# Patient Record
Sex: MALE | Race: WHITE | Employment: FULL TIME | URBAN - METROPOLITAN AREA
[De-identification: names, ages, dates, MRNs, and addresses within clinical notes are randomized per-mention and may not be internally consistent; named-entity substitution may affect disease eponyms.]

---

## 2018-12-11 ENCOUNTER — APPOINTMENT (OUTPATIENT)
Dept: CT IMAGING | Age: 38
End: 2018-12-11
Payer: COMMERCIAL

## 2018-12-11 ENCOUNTER — HOSPITAL ENCOUNTER (EMERGENCY)
Age: 38
Discharge: HOME | End: 2018-12-11
Attending: EMERGENCY MEDICINE
Payer: COMMERCIAL

## 2018-12-11 VITALS
OXYGEN SATURATION: 96 % | BODY MASS INDEX: 29.71 KG/M2 | HEART RATE: 77 BPM | RESPIRATION RATE: 18 BRPM | DIASTOLIC BLOOD PRESSURE: 74 MMHG | TEMPERATURE: 98.1 F | SYSTOLIC BLOOD PRESSURE: 115 MMHG | WEIGHT: 213 LBS

## 2018-12-11 DIAGNOSIS — R10.13 ABDOMINAL PAIN, EPIGASTRIC: Primary | ICD-10-CM

## 2018-12-11 LAB
A/G RATIO: 1.6 (ref 1.1–2.2)
ALBUMIN SERPL-MCNC: 4.5 G/DL (ref 3.4–5)
ALP BLD-CCNC: 70 U/L (ref 40–129)
ALT SERPL-CCNC: 18 U/L (ref 10–40)
ANION GAP SERPL CALCULATED.3IONS-SCNC: 11 MMOL/L (ref 3–16)
AST SERPL-CCNC: 16 U/L (ref 15–37)
BASOPHILS ABSOLUTE: 0.1 K/UL (ref 0–0.2)
BASOPHILS RELATIVE PERCENT: 0.5 %
BILIRUB SERPL-MCNC: 0.5 MG/DL (ref 0–1)
BILIRUBIN URINE: NEGATIVE
BLOOD, URINE: NEGATIVE
BUN BLDV-MCNC: 15 MG/DL (ref 7–20)
CALCIUM SERPL-MCNC: 9.3 MG/DL (ref 8.3–10.6)
CHLORIDE BLD-SCNC: 102 MMOL/L (ref 99–110)
CLARITY: CLEAR
CO2: 26 MMOL/L (ref 21–32)
COLOR: YELLOW
CREAT SERPL-MCNC: 1 MG/DL (ref 0.9–1.3)
EOSINOPHILS ABSOLUTE: 0.1 K/UL (ref 0–0.6)
EOSINOPHILS RELATIVE PERCENT: 0.8 %
GFR AFRICAN AMERICAN: >60
GFR NON-AFRICAN AMERICAN: >60
GLOBULIN: 2.9 G/DL
GLUCOSE BLD-MCNC: 107 MG/DL (ref 70–99)
GLUCOSE URINE: NEGATIVE MG/DL
HCT VFR BLD CALC: 47.3 % (ref 40.5–52.5)
HEMOGLOBIN: 16.3 G/DL (ref 13.5–17.5)
KETONES, URINE: NEGATIVE MG/DL
LEUKOCYTE ESTERASE, URINE: NEGATIVE
LIPASE: 14 U/L (ref 13–60)
LYMPHOCYTES ABSOLUTE: 1.7 K/UL (ref 1–5.1)
LYMPHOCYTES RELATIVE PERCENT: 16.7 %
MCH RBC QN AUTO: 31.6 PG (ref 26–34)
MCHC RBC AUTO-ENTMCNC: 34.4 G/DL (ref 31–36)
MCV RBC AUTO: 91.7 FL (ref 80–100)
MICROSCOPIC EXAMINATION: NORMAL
MONOCYTES ABSOLUTE: 0.6 K/UL (ref 0–1.3)
MONOCYTES RELATIVE PERCENT: 5.6 %
NEUTROPHILS ABSOLUTE: 7.9 K/UL (ref 1.7–7.7)
NEUTROPHILS RELATIVE PERCENT: 76.4 %
NITRITE, URINE: NEGATIVE
PDW BLD-RTO: 13.7 % (ref 12.4–15.4)
PH UA: 5
PLATELET # BLD: 202 K/UL (ref 135–450)
PMV BLD AUTO: 9 FL (ref 5–10.5)
POTASSIUM REFLEX MAGNESIUM: 4.5 MMOL/L (ref 3.5–5.1)
PROTEIN UA: NEGATIVE MG/DL
RBC # BLD: 5.16 M/UL (ref 4.2–5.9)
SEDIMENTATION RATE, ERYTHROCYTE: 11 MM/HR (ref 0–15)
SODIUM BLD-SCNC: 139 MMOL/L (ref 136–145)
SPECIFIC GRAVITY UA: >1.03
TOTAL PROTEIN: 7.4 G/DL (ref 6.4–8.2)
URINE REFLEX TO CULTURE: NORMAL
URINE TYPE: NORMAL
UROBILINOGEN, URINE: 0.2 E.U./DL
WBC # BLD: 10.3 K/UL (ref 4–11)

## 2018-12-11 PROCEDURE — 85025 COMPLETE CBC W/AUTO DIFF WBC: CPT

## 2018-12-11 PROCEDURE — 85652 RBC SED RATE AUTOMATED: CPT

## 2018-12-11 PROCEDURE — 99284 EMERGENCY DEPT VISIT MOD MDM: CPT

## 2018-12-11 PROCEDURE — 6370000000 HC RX 637 (ALT 250 FOR IP): Performed by: EMERGENCY MEDICINE

## 2018-12-11 PROCEDURE — 83690 ASSAY OF LIPASE: CPT

## 2018-12-11 PROCEDURE — 86140 C-REACTIVE PROTEIN: CPT

## 2018-12-11 PROCEDURE — 96374 THER/PROPH/DIAG INJ IV PUSH: CPT

## 2018-12-11 PROCEDURE — 6360000002 HC RX W HCPCS: Performed by: PHYSICIAN ASSISTANT

## 2018-12-11 PROCEDURE — 81003 URINALYSIS AUTO W/O SCOPE: CPT

## 2018-12-11 PROCEDURE — 6360000004 HC RX CONTRAST MEDICATION: Performed by: PHYSICIAN ASSISTANT

## 2018-12-11 PROCEDURE — 74177 CT ABD & PELVIS W/CONTRAST: CPT

## 2018-12-11 PROCEDURE — 80053 COMPREHEN METABOLIC PANEL: CPT

## 2018-12-11 PROCEDURE — 36415 COLL VENOUS BLD VENIPUNCTURE: CPT

## 2018-12-11 RX ORDER — FAMOTIDINE 20 MG/1
20 TABLET, FILM COATED ORAL 2 TIMES DAILY
Qty: 60 TABLET | Refills: 0 | OUTPATIENT
Start: 2018-12-11 | End: 2021-09-20

## 2018-12-11 RX ORDER — FAMOTIDINE 20 MG/1
20 TABLET, FILM COATED ORAL ONCE
Status: COMPLETED | OUTPATIENT
Start: 2018-12-11 | End: 2018-12-11

## 2018-12-11 RX ORDER — OXYCODONE HYDROCHLORIDE AND ACETAMINOPHEN 5; 325 MG/1; MG/1
2 TABLET ORAL ONCE
Status: COMPLETED | OUTPATIENT
Start: 2018-12-11 | End: 2018-12-11

## 2018-12-11 RX ORDER — DICYCLOMINE HYDROCHLORIDE 10 MG/1
10 CAPSULE ORAL 3 TIMES DAILY PRN
Qty: 30 CAPSULE | Refills: 0 | OUTPATIENT
Start: 2018-12-11 | End: 2021-09-20

## 2018-12-11 RX ORDER — DICYCLOMINE HYDROCHLORIDE 10 MG/1
10 CAPSULE ORAL ONCE
Status: COMPLETED | OUTPATIENT
Start: 2018-12-11 | End: 2018-12-11

## 2018-12-11 RX ORDER — ONDANSETRON 2 MG/ML
4 INJECTION INTRAMUSCULAR; INTRAVENOUS ONCE
Status: COMPLETED | OUTPATIENT
Start: 2018-12-11 | End: 2018-12-11

## 2018-12-11 RX ORDER — ONDANSETRON 4 MG/1
4 TABLET, ORALLY DISINTEGRATING ORAL EVERY 8 HOURS PRN
Qty: 20 TABLET | Refills: 0 | Status: SHIPPED | OUTPATIENT
Start: 2018-12-11 | End: 2021-09-20

## 2018-12-11 RX ADMIN — ONDANSETRON HYDROCHLORIDE 4 MG: 2 INJECTION, SOLUTION INTRAMUSCULAR; INTRAVENOUS at 21:23

## 2018-12-11 RX ADMIN — LIDOCAINE HYDROCHLORIDE: 20 SOLUTION ORAL; TOPICAL at 22:59

## 2018-12-11 RX ADMIN — DICYCLOMINE HYDROCHLORIDE 10 MG: 10 CAPSULE ORAL at 22:59

## 2018-12-11 RX ADMIN — FAMOTIDINE 20 MG: 20 TABLET ORAL at 22:59

## 2018-12-11 RX ADMIN — OXYCODONE AND ACETAMINOPHEN 2 TABLET: 5; 325 TABLET ORAL at 22:59

## 2018-12-11 RX ADMIN — IOPAMIDOL 75 ML: 755 INJECTION, SOLUTION INTRAVENOUS at 19:56

## 2018-12-11 ASSESSMENT — PAIN SCALES - GENERAL
PAINLEVEL_OUTOF10: 8
PAINLEVEL_OUTOF10: 9

## 2018-12-11 ASSESSMENT — PAIN DESCRIPTION - PAIN TYPE: TYPE: ACUTE PAIN

## 2018-12-11 ASSESSMENT — PAIN DESCRIPTION - LOCATION: LOCATION: ABDOMEN

## 2018-12-11 NOTE — ED PROVIDER NOTES
Vitals:    12/11/18 1813   BP: (!) 146/81   Pulse: 77   Resp: 18   Temp: 98.1 °F (36.7 °C)   SpO2: 98%       Focused physical examination of this well-appearing patient in no evidence of acute distress, non-labored breathing and skin without nori diaphoresis. Mental status grossly normal.  Normal speech and no obvious facial droop. Cardiovascular: Regular rate and rhythm. No murmurs, gallops or rubs. Respiratory: No evidence of acute respiratory distress. Lungs clear to auscultation bilaterally. No wheezes, rhonchi or rhales. GI: Abdomen soft, NT/ND. No rigidity, rebound, or guarding. Normal bowel sounds. No CVA tenderness. Brief neurologic: Alert and speech appropriate. Face is symmetric. No gross motor or sensory deficits noted. PLAN:  Labs Reviewed   CBC WITH AUTO DIFFERENTIAL   COMPREHENSIVE METABOLIC PANEL W/ REFLEX TO MG FOR LOW K   LIPASE   URINE RT REFLEX TO CULTURE     CT ABDOMEN PELVIS W IV CONTRAST    Initial triage orders placed on this patient are as above. Patient was initially offered pain medicine here in the emergency department setting that it was his preference to wait and see what happens before he proceeds with this. Please see notes from other emergency department providers regarding comprehensive evaluation including full history, physical examination, interpretation of results, and medical decision making/disposition.          Toni Freedman PA-C  12/11/18 3583

## 2018-12-12 LAB — C-REACTIVE PROTEIN: 3.4 MG/L (ref 0–5.1)

## 2018-12-12 NOTE — ED NOTES
Discharge instructions given, patient acknowledged understanding and denied any need for further information, rx given x3, patient ambulated out of ed upon discharge with no wants or needs      Radha Bradshaw RN  12/11/18 1280

## 2018-12-12 NOTE — ED PROVIDER NOTES
facility-administered medications for this encounter. Current Outpatient Prescriptions   Medication Sig Dispense Refill    dicyclomine (BENTYL) 10 MG capsule Take 1 capsule by mouth 3 times daily as needed (bowel spasms) 30 capsule 0    ondansetron (ZOFRAN ODT) 4 MG disintegrating tablet Take 1 tablet by mouth every 8 hours as needed for Nausea or Vomiting 20 tablet 0    famotidine (PEPCID) 20 MG tablet Take 1 tablet by mouth 2 times daily 60 tablet 0    naproxen (NAPROSYN) 500 MG tablet Take 1 tablet by mouth 2 times daily for 20 doses 20 tablet 0    cyclobenzaprine (FLEXERIL) 10 MG tablet Take 1 tablet by mouth 3 times daily as needed for Muscle spasms 20 tablet 0     Allergies   Allergen Reactions    Ultram [Tramadol]        REVIEW OF SYSTEMS  10 systems reviewed, pertinent positives per HPI otherwise noted to be negative. PHYSICAL EXAM  /74   Pulse 77   Temp 98.1 °F (36.7 °C)   Resp 18   Wt 213 lb (96.6 kg)   SpO2 96%   BMI 29.71 kg/m²    GENERAL APPEARANCE: Awake and alert. Cooperative. No distress. HENT: Normocephalic. Atraumatic. Mucous membranes are dry. NECK: Supple. EYES: PERRL. EOM's grossly intact. HEART/CHEST: RRR. No murmurs. No chest wall tenderness. LUNGS: Respirations unlabored. CTAB. Good air exchange. Speaking comfortably in full sentences. ABDOMEN: Moderate epigastric ttp and elsewhere mild diffuse nonfocal tenderness. Soft. Non-distended. No masses. No organomegaly. No guarding or rebound. Normal bowel sounds throughout. MUSCULOSKELETAL: No extremity edema. Compartments soft. No deformity. No tenderness in the extremities. All extremities neurovascularly intact. SKIN: Warm and dry. No acute rashes. NEUROLOGICAL: Alert and oriented. CN's 2-12 intact. No gross facial drooping. Strength 5/5, sensation intact. 2 plus DTR's in knees bilaterally. Gait normal.  PSYCHIATRIC: Normal mood and affect. LABS  I have reviewed all labs for this visit.    Results for orders placed or performed during the hospital encounter of 12/11/18   CBC Auto Differential   Result Value Ref Range    WBC 10.3 4.0 - 11.0 K/uL    RBC 5.16 4.20 - 5.90 M/uL    Hemoglobin 16.3 13.5 - 17.5 g/dL    Hematocrit 47.3 40.5 - 52.5 %    MCV 91.7 80.0 - 100.0 fL    MCH 31.6 26.0 - 34.0 pg    MCHC 34.4 31.0 - 36.0 g/dL    RDW 13.7 12.4 - 15.4 %    Platelets 899 812 - 315 K/uL    MPV 9.0 5.0 - 10.5 fL    Neutrophils % 76.4 %    Lymphocytes % 16.7 %    Monocytes % 5.6 %    Eosinophils % 0.8 %    Basophils % 0.5 %    Neutrophils # 7.9 (H) 1.7 - 7.7 K/uL    Lymphocytes # 1.7 1.0 - 5.1 K/uL    Monocytes # 0.6 0.0 - 1.3 K/uL    Eosinophils # 0.1 0.0 - 0.6 K/uL    Basophils # 0.1 0.0 - 0.2 K/uL   Comprehensive Metabolic Panel w/ Reflex to MG   Result Value Ref Range    Sodium 139 136 - 145 mmol/L    Potassium reflex Magnesium 4.5 3.5 - 5.1 mmol/L    Chloride 102 99 - 110 mmol/L    CO2 26 21 - 32 mmol/L    Anion Gap 11 3 - 16    Glucose 107 (H) 70 - 99 mg/dL    BUN 15 7 - 20 mg/dL    CREATININE 1.0 0.9 - 1.3 mg/dL    GFR Non-African American >60 >60    GFR African American >60 >60    Calcium 9.3 8.3 - 10.6 mg/dL    Total Protein 7.4 6.4 - 8.2 g/dL    Alb 4.5 3.4 - 5.0 g/dL    Albumin/Globulin Ratio 1.6 1.1 - 2.2    Total Bilirubin 0.5 0.0 - 1.0 mg/dL    Alkaline Phosphatase 70 40 - 129 U/L    ALT 18 10 - 40 U/L    AST 16 15 - 37 U/L    Globulin 2.9 g/dL   Lipase   Result Value Ref Range    Lipase 14.0 13.0 - 60.0 U/L   Urinalysis Reflex to Culture   Result Value Ref Range    Color, UA YELLOW Straw/Yellow    Clarity, UA Clear Clear    Glucose, Ur Negative Negative mg/dL    Bilirubin Urine Negative Negative    Ketones, Urine Negative Negative mg/dL    Specific Gravity, UA >1.030 1.005 - 1.030    Blood, Urine Negative Negative    pH, UA 5.0 5.0 - 8.0    Protein, UA Negative Negative mg/dL    Urobilinogen, Urine 0.2 <2.0 E.U./dL    Nitrite, Urine Negative Negative    Leukocyte Esterase, Urine Negative Negative Microscopic Examination Not Indicated     Urine Reflex to Culture Not Indicated     Urine Type Not Specified    Sedimentation Rate   Result Value Ref Range    Sed Rate 11 0 - 15 mm/Hr     RADIOLOGY    Ct Abdomen Pelvis W Iv Contrast Additional Contrast? None    Result Date: 12/11/2018  EXAMINATION: CT OF THE ABDOMEN AND PELVIS WITH CONTRAST 12/11/2018 5:09 pm TECHNIQUE: CT of the abdomen and pelvis was performed with the administration of intravenous contrast. Multiplanar reformatted images are provided for review. Dose modulation, iterative reconstruction, and/or weight based adjustment of the mA/kV was utilized to reduce the radiation dose to as low as reasonably achievable. COMPARISON: 06/13/2018 HISTORY: ORDERING SYSTEM PROVIDED HISTORY: h/o colitis TECHNOLOGIST PROVIDED HISTORY: Additional Contrast?->None Ordering Physician Provided Reason for Exam: h/o colitis Acuity: Acute Type of Exam: Initial FINDINGS: Lower Chest: Visualized lungs are clear. Organs: The liver, spleen, adrenals, and pancreas are unremarkable. Gallbladder and common duct within normal limits. No acute or suspicious renal abnormalities are identified. GI/Bowel: There is mural thickening involving the ileum in the rightward aspect of the abdomen, seen greatest just proximal to the terminal ileum, with evidence of submucosal edema. The more proximal aspects of the small bowel are relatively normal in appearance. The stomach and duodenum are unremarkable. The large bowel is decompressed but otherwise unremarkable, without convincing CT evidence of colitis. Pelvis: Urinary bladder and prostate within normal limits. No free pelvic fluid. Peritoneum/Retroperitoneum: Shotty retroperitoneal lymph nodes are identified, presumably reactive. The abdominal aorta is normal in caliber. The superior mesenteric artery appears to be enhancing. Bones/Soft Tissues: Evidence of remote right hamstring avulsion injury.  Sclerotic punctate foci within the

## 2019-11-14 NOTE — LETTER
[facility]  [address] [place] [address], [address]             December 11, 2018    Patient: Tala Garza   YOB: 1980   Date of Visit: 12/11/2018       To Whom It May Concern:    Casa Schroeder was seen and treated in our emergency department on 12/11/2018. He may return to work on 12/13/18. His wife was also here and is excused for the same duration.       Sincerely,         Vilma Berry MD Referred by: Sheldon Smith MD; Medical Diagnosis (from order):    Diagnosis Information      Diagnosis    719.45 (ICD-9-CM) - M25.552 (ICD-10-CM) - Pain of left hip joint                Physical Therapy -  Daily Treatment Note    Visit:  4     SUBJECTIVE                                                                                                             Hip is feeling so so. Inner portion is improving, outer part is still painful.         OBJECTIVE                                                                                                                          TREATMENT                                                                                                                  Therapeutic Exercise:    Clams 15x orange band  Sacral wedge  with orange band marching, BLFO 10x  Forward bending wide stance 30 sec 2x   bike 5 min  Step hip flexor stretch 30 sec 2x  Standing iliotibial band stretch 30 sec 2x  Modified hydrant and donkey kick orange band 10x  Lateral and monster walk 1 lap orange  Butterfly gentle in/out x2 min  sitting orange band hip internal rotation 10x  Hip abd wall push -next  Manual Therapy:  Iliacus and adductors active release, belt and lateral distraction -defer  Left sacral mobilization  iliotibial band and gluteal release  Mobilization to left hip: lateral distraction                                         Caudal glide-defer                                         Prone posterior anterior glide-defer                                         Prone ANNALISE's position postero-lateral glide-defer      Skilled input: verbal instruction/cues and tactile instruction/cues    Home Exercise Program: (*above indicates provided as part of home exercise program)  Modified ronel stretch 1 min both  Clams 15x  Forward bending wide stance 30 sec 2x  Butterfly stretch 11/12  Access Code: [email or web address]   URL: [email or web address]   Date: 11/19/2019   Prepared by: Shala  (Eufemia) Liam      Exercises  · Clamshell with Resistance - 15 reps - 1 sets - 1x daily - 7x weekly  · Hooklying Isometric Clamshell - 10 reps - 1 sets - 1x daily - 7x weekly  · Supine March - 10 reps - 1 sets - 1x daily - 7x weekly  · Hydrant with Resistance - 10 reps - 1 sets - 1x daily - 7x weekly  · Quadruped Hip Extension with Mini Swiss Ball - 10 reps - 1 sets - 1x daily - 7x weekly          ASSESSMENT                                                                                                                 Noting + left sacral flexion test. Tightness in the gluteals and iliotibial band noted continue to work on these.   PLAN                                                                                                                             Suggestions for next session as indicated: Progress per plan of care       Procedures and total treatment time documented Time Entry flowsheet.

## 2020-03-22 ENCOUNTER — HOSPITAL ENCOUNTER (EMERGENCY)
Age: 40
Discharge: HOME | End: 2020-03-22
Payer: COMMERCIAL

## 2020-03-22 VITALS
HEIGHT: 71 IN | TEMPERATURE: 98 F | OXYGEN SATURATION: 97 % | WEIGHT: 225 LBS | RESPIRATION RATE: 18 BRPM | DIASTOLIC BLOOD PRESSURE: 79 MMHG | SYSTOLIC BLOOD PRESSURE: 160 MMHG | BODY MASS INDEX: 31.5 KG/M2 | HEART RATE: 72 BPM

## 2020-03-22 PROCEDURE — 99282 EMERGENCY DEPT VISIT SF MDM: CPT

## 2020-03-22 PROCEDURE — 6370000000 HC RX 637 (ALT 250 FOR IP): Performed by: PHYSICIAN ASSISTANT

## 2020-03-22 RX ORDER — LIDOCAINE 50 MG/G
1 PATCH TOPICAL DAILY
Qty: 30 PATCH | Refills: 0 | Status: SHIPPED | OUTPATIENT
Start: 2020-03-22

## 2020-03-22 RX ORDER — CYCLOBENZAPRINE HCL 10 MG
10 TABLET ORAL 3 TIMES DAILY PRN
Qty: 30 TABLET | Refills: 0 | Status: SHIPPED | OUTPATIENT
Start: 2020-03-22 | End: 2020-04-01

## 2020-03-22 RX ORDER — LIDOCAINE 4 G/G
1 PATCH TOPICAL DAILY
Status: DISCONTINUED | OUTPATIENT
Start: 2020-03-22 | End: 2020-03-22 | Stop reason: HOSPADM

## 2020-03-22 RX ORDER — CYCLOBENZAPRINE HCL 10 MG
10 TABLET ORAL ONCE
Status: COMPLETED | OUTPATIENT
Start: 2020-03-22 | End: 2020-03-22

## 2020-03-22 RX ORDER — ACETAMINOPHEN 500 MG
1000 TABLET ORAL ONCE
Status: COMPLETED | OUTPATIENT
Start: 2020-03-22 | End: 2020-03-22

## 2020-03-22 RX ORDER — IBUPROFEN 600 MG/1
600 TABLET ORAL EVERY 6 HOURS PRN
Qty: 120 TABLET | Refills: 0 | Status: ON HOLD | OUTPATIENT
Start: 2020-03-22 | End: 2022-06-27

## 2020-03-22 RX ORDER — ACETAMINOPHEN 325 MG/1
650 TABLET ORAL EVERY 6 HOURS PRN
Qty: 120 TABLET | Refills: 3 | Status: ON HOLD | OUTPATIENT
Start: 2020-03-22 | End: 2022-06-27

## 2020-03-22 RX ADMIN — ACETAMINOPHEN 1000 MG: 500 TABLET ORAL at 16:37

## 2020-03-22 RX ADMIN — CYCLOBENZAPRINE 10 MG: 10 TABLET, FILM COATED ORAL at 16:37

## 2020-03-22 ASSESSMENT — ENCOUNTER SYMPTOMS
COUGH: 0
CHEST TIGHTNESS: 0
CONSTIPATION: 0
NAUSEA: 0
BACK PAIN: 1
SHORTNESS OF BREATH: 0
ABDOMINAL PAIN: 0
RESPIRATORY NEGATIVE: 1
DIARRHEA: 0
COLOR CHANGE: 0
VOMITING: 0

## 2020-03-22 ASSESSMENT — PAIN SCALES - GENERAL
PAINLEVEL_OUTOF10: 8
PAINLEVEL_OUTOF10: 8

## 2020-03-22 NOTE — ED PROVIDER NOTES
Oral Given 3/22/20 1637)   cyclobenzaprine (FLEXERIL) tablet 10 mg (10 mg Oral Given 3/22/20 1637)       Patient is a 48-year-old male who presents to the ED for low back pain. Low back pain after standing up and feeling a pop in his back about 2 hours prior to arrival.  Upon examination patient able to ambulate here in the ED. Patient has reassuring neurologic semination here in the ED. No direct injury or trauma and do not believe x-ray imaging indicated this time. Reassuring neurologic examination do not believe CT/MRI indicated this time. Likely some from low back pain which I believe most likely musculoskeletal etiology. Given Tylenol, Flexeril and Lidoderm here in the ED. Will discharge home with Flexeril, ibuprofen, Lidoderm and acetaminophen for home. Follow-up with PCP. Return to ED for any worsening symptoms. Low suspicion for acute fracture, dislocation, cauda equina, epidural abscess, spinal stenosis, cord compression, AAA, dissection, retroperitoneal bleed, pyelonephritis, nephrolithiasis, surgical abdomen or other emergent etiology at this time. Patient instructed on resting at home. Patient instructed on stretching. Patient instructed to avoid lifting or exertional activities over the next couple of days. Instructed on ice for the first 48 hours then heat. FINAL IMPRESSION      1.  Acute bilateral low back pain without sciatica          DISPOSITION/PLAN   DISPOSITION Decision To Discharge 03/22/2020 04:20:15 PM      PATIENT REFERREDTO:  [facility]  [address] [place] [address]  [phone]  Go to   As needed, If symptoms worsen    [facility]) Pre-Services  [phone]          DISCHARGE MEDICATIONS:  New Prescriptions    ACETAMINOPHEN (AMINOFEN) 325 MG TABLET    Take 2 tablets by mouth every 6 hours as needed for Pain    CYCLOBENZAPRINE (FLEXERIL) 10 MG TABLET    Take 1 tablet by mouth 3 times daily as needed for Muscle spasms    IBUPROFEN

## 2020-06-03 ENCOUNTER — APPOINTMENT (OUTPATIENT)
Dept: GENERAL RADIOLOGY | Age: 40
End: 2020-06-03
Payer: COMMERCIAL

## 2020-06-03 VITALS
DIASTOLIC BLOOD PRESSURE: 74 MMHG | SYSTOLIC BLOOD PRESSURE: 146 MMHG | TEMPERATURE: 99 F | RESPIRATION RATE: 18 BRPM | HEIGHT: 71 IN | BODY MASS INDEX: 31.5 KG/M2 | WEIGHT: 225 LBS | OXYGEN SATURATION: 95 % | HEART RATE: 96 BPM

## 2020-06-03 PROCEDURE — 73630 X-RAY EXAM OF FOOT: CPT

## 2020-06-03 ASSESSMENT — PAIN DESCRIPTION - LOCATION: LOCATION: FOOT

## 2020-06-03 ASSESSMENT — PAIN DESCRIPTION - ORIENTATION: ORIENTATION: LEFT

## 2020-06-03 ASSESSMENT — PAIN DESCRIPTION - PAIN TYPE: TYPE: ACUTE PAIN

## 2020-06-03 ASSESSMENT — PAIN SCALES - GENERAL: PAINLEVEL_OUTOF10: 7

## 2020-06-04 ENCOUNTER — HOSPITAL ENCOUNTER (EMERGENCY)
Age: 40
Discharge: HOME | End: 2020-06-04
Attending: EMERGENCY MEDICINE
Payer: COMMERCIAL

## 2020-06-04 ENCOUNTER — APPOINTMENT (OUTPATIENT)
Dept: GENERAL RADIOLOGY | Age: 40
End: 2020-06-04
Payer: COMMERCIAL

## 2020-06-04 PROCEDURE — 6370000000 HC RX 637 (ALT 250 FOR IP): Performed by: EMERGENCY MEDICINE

## 2020-06-04 PROCEDURE — 73610 X-RAY EXAM OF ANKLE: CPT

## 2020-06-04 PROCEDURE — 99283 EMERGENCY DEPT VISIT LOW MDM: CPT

## 2020-06-04 RX ORDER — HYDROCODONE BITARTRATE AND ACETAMINOPHEN 5; 325 MG/1; MG/1
1 TABLET ORAL EVERY 4 HOURS PRN
Qty: 18 TABLET | Refills: 0 | Status: SHIPPED | OUTPATIENT
Start: 2020-06-04 | End: 2020-06-07

## 2020-06-04 RX ORDER — MELOXICAM 7.5 MG/1
7.5 TABLET ORAL DAILY
Qty: 90 TABLET | Refills: 1 | Status: ON HOLD | OUTPATIENT
Start: 2020-06-04 | End: 2022-06-27

## 2020-06-04 RX ORDER — IBUPROFEN 800 MG/1
800 TABLET ORAL ONCE
Status: COMPLETED | OUTPATIENT
Start: 2020-06-04 | End: 2020-06-04

## 2020-06-04 RX ORDER — OXYCODONE HYDROCHLORIDE AND ACETAMINOPHEN 5; 325 MG/1; MG/1
1 TABLET ORAL ONCE
Status: COMPLETED | OUTPATIENT
Start: 2020-06-04 | End: 2020-06-04

## 2020-06-04 RX ADMIN — IBUPROFEN 800 MG: 800 TABLET, FILM COATED ORAL at 02:29

## 2020-06-04 RX ADMIN — OXYCODONE HYDROCHLORIDE AND ACETAMINOPHEN 1 TABLET: 5; 325 TABLET ORAL at 02:31

## 2020-06-04 ASSESSMENT — PAIN SCALES - GENERAL: PAINLEVEL_OUTOF10: 6

## 2020-06-04 NOTE — ED NOTES
Nursing Discharge Notes:  -Patient discharged at this time in no acute distress after verbalizing understanding of discharge instructions.  -A copy of the AVS was reviewed with pt.  -Pt received applicable scripts which were reviewed with pt by this RN. -Pt was given the opportunity to ask questions before signing for discharge.    -Pt left ambulatory to lobby / discharge area. Patient Education:  Learner - Patient. Motivation and Readiness To Learn - Medium to High  Barriers To Learning - None  Learning Preference / Provided Instructions - Both written and verbal discharge instructions.        Aren Mosqueda RN  06/04/20 8963

## 2021-09-20 ENCOUNTER — APPOINTMENT (OUTPATIENT)
Dept: CT IMAGING | Age: 41
End: 2021-09-20
Payer: COMMERCIAL

## 2021-09-20 ENCOUNTER — HOSPITAL ENCOUNTER (EMERGENCY)
Age: 41
Discharge: HOME | End: 2021-09-20
Payer: COMMERCIAL

## 2021-09-20 VITALS
SYSTOLIC BLOOD PRESSURE: 129 MMHG | HEIGHT: 71 IN | BODY MASS INDEX: 31.5 KG/M2 | TEMPERATURE: 98.2 F | RESPIRATION RATE: 18 BRPM | WEIGHT: 225 LBS | HEART RATE: 70 BPM | OXYGEN SATURATION: 99 % | DIASTOLIC BLOOD PRESSURE: 79 MMHG

## 2021-09-20 DIAGNOSIS — R10.9 LEFT FLANK PAIN: Primary | ICD-10-CM

## 2021-09-20 DIAGNOSIS — N20.1 URETEROLITHIASIS: ICD-10-CM

## 2021-09-20 LAB
A/G RATIO: 1.7 (ref 1.1–2.2)
ALBUMIN SERPL-MCNC: 4.5 G/DL (ref 3.4–5)
ALP BLD-CCNC: 84 U/L (ref 40–129)
ALT SERPL-CCNC: 15 U/L (ref 10–40)
ANION GAP SERPL CALCULATED.3IONS-SCNC: 11 MMOL/L (ref 3–16)
AST SERPL-CCNC: 19 U/L (ref 15–37)
BASOPHILS ABSOLUTE: 0 K/UL (ref 0–0.2)
BASOPHILS RELATIVE PERCENT: 0.2 %
BILIRUB SERPL-MCNC: 0.9 MG/DL (ref 0–1)
BILIRUBIN URINE: ABNORMAL
BLOOD, URINE: ABNORMAL
BUN BLDV-MCNC: 22 MG/DL (ref 7–20)
CALCIUM SERPL-MCNC: 9.2 MG/DL (ref 8.3–10.6)
CHLORIDE BLD-SCNC: 103 MMOL/L (ref 99–110)
CLARITY: ABNORMAL
CO2: 23 MMOL/L (ref 21–32)
COLOR: ABNORMAL
CREAT SERPL-MCNC: 1.1 MG/DL (ref 0.9–1.3)
EOSINOPHILS ABSOLUTE: 0.1 K/UL (ref 0–0.6)
EOSINOPHILS RELATIVE PERCENT: 0.4 %
EPITHELIAL CELLS, UA: 1 /HPF (ref 0–5)
GFR AFRICAN AMERICAN: >60
GFR NON-AFRICAN AMERICAN: >60
GLOBULIN: 2.6 G/DL
GLUCOSE BLD-MCNC: 143 MG/DL (ref 70–99)
GLUCOSE URINE: NEGATIVE MG/DL
HCT VFR BLD CALC: 44.2 % (ref 40.5–52.5)
HEMOGLOBIN: 15.5 G/DL (ref 13.5–17.5)
HYALINE CASTS: 5 /LPF (ref 0–8)
KETONES, URINE: 40 MG/DL
LEUKOCYTE ESTERASE, URINE: ABNORMAL
LIPASE: 16 U/L (ref 13–60)
LYMPHOCYTES ABSOLUTE: 1.6 K/UL (ref 1–5.1)
LYMPHOCYTES RELATIVE PERCENT: 10.8 %
MCH RBC QN AUTO: 32.3 PG (ref 26–34)
MCHC RBC AUTO-ENTMCNC: 35.1 G/DL (ref 31–36)
MCV RBC AUTO: 91.9 FL (ref 80–100)
MICROSCOPIC EXAMINATION: YES
MONOCYTES ABSOLUTE: 0.9 K/UL (ref 0–1.3)
MONOCYTES RELATIVE PERCENT: 6.3 %
NEUTROPHILS ABSOLUTE: 12.2 K/UL (ref 1.7–7.7)
NEUTROPHILS RELATIVE PERCENT: 82.3 %
NITRITE, URINE: POSITIVE
PDW BLD-RTO: 13 % (ref 12.4–15.4)
PH UA: 6 (ref 5–8)
PLATELET # BLD: 191 K/UL (ref 135–450)
PMV BLD AUTO: 8.8 FL (ref 5–10.5)
POTASSIUM REFLEX MAGNESIUM: 3.6 MMOL/L (ref 3.5–5.1)
PROTEIN UA: >=300 MG/DL
RBC # BLD: 4.81 M/UL (ref 4.2–5.9)
RBC UA: >900 /HPF (ref 0–4)
SODIUM BLD-SCNC: 137 MMOL/L (ref 136–145)
SPECIFIC GRAVITY UA: >1.03 (ref 1–1.03)
TOTAL PROTEIN: 7.1 G/DL (ref 6.4–8.2)
URINE REFLEX TO CULTURE: YES
URINE TYPE: ABNORMAL
UROBILINOGEN, URINE: 1 E.U./DL
WBC # BLD: 14.8 K/UL (ref 4–11)
WBC UA: 24 /HPF (ref 0–5)

## 2021-09-20 PROCEDURE — 36415 COLL VENOUS BLD VENIPUNCTURE: CPT

## 2021-09-20 PROCEDURE — 81001 URINALYSIS AUTO W/SCOPE: CPT

## 2021-09-20 PROCEDURE — 6370000000 HC RX 637 (ALT 250 FOR IP): Performed by: PHYSICIAN ASSISTANT

## 2021-09-20 PROCEDURE — 80053 COMPREHEN METABOLIC PANEL: CPT

## 2021-09-20 PROCEDURE — 85025 COMPLETE CBC W/AUTO DIFF WBC: CPT

## 2021-09-20 PROCEDURE — 99283 EMERGENCY DEPT VISIT LOW MDM: CPT

## 2021-09-20 PROCEDURE — 74176 CT ABD & PELVIS W/O CONTRAST: CPT

## 2021-09-20 PROCEDURE — 2580000003 HC RX 258: Performed by: PHYSICIAN ASSISTANT

## 2021-09-20 PROCEDURE — 6360000002 HC RX W HCPCS: Performed by: PHYSICIAN ASSISTANT

## 2021-09-20 PROCEDURE — 96374 THER/PROPH/DIAG INJ IV PUSH: CPT

## 2021-09-20 PROCEDURE — 83690 ASSAY OF LIPASE: CPT

## 2021-09-20 PROCEDURE — 87086 URINE CULTURE/COLONY COUNT: CPT

## 2021-09-20 PROCEDURE — 96375 TX/PRO/DX INJ NEW DRUG ADDON: CPT

## 2021-09-20 RX ORDER — KETOROLAC TROMETHAMINE 30 MG/ML
30 INJECTION, SOLUTION INTRAMUSCULAR; INTRAVENOUS ONCE
Status: COMPLETED | OUTPATIENT
Start: 2021-09-20 | End: 2021-09-20

## 2021-09-20 RX ORDER — ONDANSETRON 4 MG/1
4 TABLET, ORALLY DISINTEGRATING ORAL EVERY 8 HOURS PRN
Qty: 20 TABLET | Refills: 0 | Status: ON HOLD | OUTPATIENT
Start: 2021-09-20 | End: 2022-06-27

## 2021-09-20 RX ORDER — ONDANSETRON 2 MG/ML
4 INJECTION INTRAMUSCULAR; INTRAVENOUS ONCE
Status: COMPLETED | OUTPATIENT
Start: 2021-09-20 | End: 2021-09-20

## 2021-09-20 RX ORDER — TAMSULOSIN HYDROCHLORIDE 0.4 MG/1
0.4 CAPSULE ORAL DAILY
Qty: 4 CAPSULE | Refills: 0 | Status: ON HOLD | OUTPATIENT
Start: 2021-09-20 | End: 2022-06-27

## 2021-09-20 RX ORDER — HYDROCODONE BITARTRATE AND ACETAMINOPHEN 5; 325 MG/1; MG/1
1 TABLET ORAL EVERY 6 HOURS PRN
Qty: 12 TABLET | Refills: 0 | Status: SHIPPED | OUTPATIENT
Start: 2021-09-20 | End: 2021-09-23

## 2021-09-20 RX ORDER — 0.9 % SODIUM CHLORIDE 0.9 %
1000 INTRAVENOUS SOLUTION INTRAVENOUS ONCE
Status: COMPLETED | OUTPATIENT
Start: 2021-09-20 | End: 2021-09-20

## 2021-09-20 RX ORDER — TAMSULOSIN HYDROCHLORIDE 0.4 MG/1
0.4 CAPSULE ORAL ONCE
Status: COMPLETED | OUTPATIENT
Start: 2021-09-20 | End: 2021-09-20

## 2021-09-20 RX ORDER — HYDROCODONE BITARTRATE AND ACETAMINOPHEN 5; 325 MG/1; MG/1
1 TABLET ORAL ONCE
Status: COMPLETED | OUTPATIENT
Start: 2021-09-20 | End: 2021-09-20

## 2021-09-20 RX ORDER — CEFUROXIME AXETIL 250 MG/1
250 TABLET ORAL 2 TIMES DAILY
Qty: 14 TABLET | Refills: 0 | Status: SHIPPED | OUTPATIENT
Start: 2021-09-20 | End: 2021-09-27

## 2021-09-20 RX ORDER — MORPHINE SULFATE 4 MG/ML
4 INJECTION, SOLUTION INTRAMUSCULAR; INTRAVENOUS ONCE
Status: COMPLETED | OUTPATIENT
Start: 2021-09-20 | End: 2021-09-20

## 2021-09-20 RX ADMIN — SODIUM CHLORIDE 1000 ML: 9 INJECTION, SOLUTION INTRAVENOUS at 10:17

## 2021-09-20 RX ADMIN — ONDANSETRON 4 MG: 2 INJECTION INTRAMUSCULAR; INTRAVENOUS at 10:15

## 2021-09-20 RX ADMIN — KETOROLAC TROMETHAMINE 30 MG: 30 INJECTION, SOLUTION INTRAMUSCULAR at 11:01

## 2021-09-20 RX ADMIN — HYDROCODONE BITARTRATE AND ACETAMINOPHEN 1 TABLET: 5; 325 TABLET ORAL at 11:09

## 2021-09-20 RX ADMIN — TAMSULOSIN HYDROCHLORIDE 0.4 MG: 0.4 CAPSULE ORAL at 11:09

## 2021-09-20 RX ADMIN — MORPHINE SULFATE 4 MG: 4 INJECTION INTRAVENOUS at 10:16

## 2021-09-20 ASSESSMENT — ENCOUNTER SYMPTOMS
CONSTIPATION: 0
SHORTNESS OF BREATH: 0
RESPIRATORY NEGATIVE: 1
ABDOMINAL PAIN: 1
COLOR CHANGE: 0
CHEST TIGHTNESS: 0
VOMITING: 1
DIARRHEA: 0
COUGH: 0
NAUSEA: 1
BACK PAIN: 1

## 2021-09-20 ASSESSMENT — PAIN SCALES - GENERAL
PAINLEVEL_OUTOF10: 10

## 2021-09-20 NOTE — LETTER
[facility] Emergency Department  [address] [address]  Phone: [phone]               September 20, 2021    Patient: Kenn Iron   YOB: 1980   Date of Visit: 9/20/2021       To Whom It May Concern:    So Granger was seen and treated in our emergency department on 9/20/2021. He may return to work on 09/22/2021.       Sincerely,       Sami Myers RN         Signature:__________________________________

## 2021-09-20 NOTE — LETTER
[facility] Emergency Department      [address] [facility], [address]            PROOF OF PRESENCE      To Whom It May Concern: Manuel aHssan was present in the Emergency Department at [facility] on 09/20/2021.                                      Sincerely,        Yara Ferreira RN

## 2021-09-20 NOTE — ED NOTES
Bed: 22  Expected date:   Expected time:   Means of arrival:   Comments:  Belkis Garcia RN  09/20/21 8606

## 2021-09-20 NOTE — ED NOTES
Patient received morphine about 15 minutes ago. Patient called out frustrating stating his medications are not working and he needs something else. Will update provider.       Martin Cohen RN  09/20/21 1977

## 2021-09-20 NOTE — ED NOTES
Pt Discharged in stable condition, VSS, no signs of distress, discharge instructions and meds reviewed. Pt verbalizes understanding and states no further questions or concerns unaddressed.        Hollis Tena RN  09/20/21 6867

## 2021-09-20 NOTE — ED NOTES
Pt refused to stand from the wheelchair to the scale. Pt stated unable to stand from the wheelchair to get weight. Transferred pt in the wheelchair to [place]. Pt able to stand and get into stretcher and lay back to get vitals started for the triage, MAGI Kaiser  09/20/21 3121

## 2021-09-20 NOTE — ED PROVIDER NOTES
[address]        Pt Name: Sanchez Nguyen  MRN: [id number]  [place] 1980  Date of evaluation: 9/20/2021  Provider: WAI Osullivan  PCP: Lonny Wheeler DO  Note Started: 9:47 AM EDT       [place]. I have evaluated this patient. My supervising physician was available for consultation. CHIEF COMPLAINT       Chief Complaint   Patient presents with    Flank Pain     Left flank pain that began suddenly upon awaking. Denies problems with urination or bowels.  Nausea       HISTORY OF PRESENT ILLNESS   (Location, Timing/Onset, Context/Setting, Quality, Duration, Modifying Factors, Severity, Associated Signs and Symptoms)  Note limiting factors. Chief Complaint: Left flank pain    Sanchez Nguyen is a 38 y.o. male with no significant past medical history who presents to the ED with complaint of left flank pain. Patient states woke up this morning around 830 with severe pain to his left flank that rates into his left lower quadrant. Patient denies history of similar symptoms in the past.  Patient denies any dysuria, frequency, urgency or hematuria. Denies any history of kidney stones. Denies any changes in bowel movements. States he has had some nausea and vomiting. Denies chest pain or shortness of breath. Patient denies any surgeries to the abdomen in the past.  Patient became concerned and came to the ED for further evaluation and treatment. States he had some chills but denies any fever. States pain is sharp in nature rated 10/10. Nursing Notes were all reviewed and agreed with or any disagreements were addressed in the HPI. REVIEW OF SYSTEMS    (2-9 systems for level 4, 10 or more for level 5)     Review of Systems   Constitutional: Negative for activity change, appetite change, chills, diaphoresis, fatigue and fever. Respiratory: Negative. Negative for cough, chest tightness and shortness of breath. Cardiovascular: Negative. Negative for chest pain, palpitations and leg swelling. Gastrointestinal: Positive for abdominal pain, nausea and vomiting. Negative for constipation and diarrhea. Genitourinary: Positive for flank pain. Negative for decreased urine volume, difficulty urinating, discharge, dysuria, frequency, genital sores, hematuria, penile pain, penile swelling, scrotal swelling, testicular pain and urgency. Musculoskeletal: Positive for back pain. Negative for arthralgias, myalgias, neck pain and neck stiffness. Skin: Negative for color change, pallor, rash and wound. Neurological: Negative for dizziness, light-headedness and headaches. Positives and Pertinent negatives as per HPI. Except as noted above in the ROS, all other systems were reviewed and negative. PAST MEDICAL HISTORY     Past Medical History:   Diagnosis Date    Diabetes mellitus ([facility] [address]     Hypoglycemia          SURGICAL HISTORY     Past Surgical History:   Procedure Laterality Date    FRACTURE SURGERY           CURRENTMEDICATIONS       Previous Medications    ACETAMINOPHEN (AMINOFEN) 325 MG TABLET    Take 2 tablets by mouth every 6 hours as needed for Pain    IBUPROFEN (IBU) 600 MG TABLET    Take 1 tablet by mouth every 6 hours as needed for Pain    LIDOCAINE (LIDODERM) 5 %    Place 1 patch onto the skin daily 12 hours on, 12 hours off. MELOXICAM (MOBIC) 7.5 MG TABLET    Take 1 tablet by mouth daily    NAPROXEN (NAPROSYN) 500 MG TABLET    Take 1 tablet by mouth 2 times daily for 20 doses         ALLERGIES     Ultram [tramadol]    FAMILYHISTORY     History reviewed. No pertinent family history.        SOCIAL HISTORY       Social History     Tobacco Use    Smoking status: Current Every Day Smoker     Packs/day: 0.50    Smokeless tobacco: Never Used   Substance Use Topics    Alcohol use: Yes     Comment: socially    Drug use: No       SCREENINGS             PHYSICAL EXAM    (up to 7 for level 4, 8 or more for LABS:    Labs Reviewed   CBC WITH AUTO DIFFERENTIAL - Abnormal; Notable for the following components:       Result Value    WBC 14.8 (*)     Neutrophils Absolute 12.2 (*)     All other components within normal limits    Narrative:     Performed at:  [facility] [address] [address] [employer]   Phone [phone]   COMPREHENSIVE METABOLIC PANEL W/ REFLEX TO MG FOR LOW K - Abnormal; Notable for the following components:    Glucose 143 (*)     BUN 22 (*)     All other components within normal limits    Narrative:     Performed at:  [facility] [address], [place], [address]   Phone [phone]   URINE RT REFLEX TO CULTURE - Abnormal; Notable for the following components:    Color, UA BROWN (*)     Clarity, UA TURBID (*)     Bilirubin Urine LARGE (*)     Ketones, Urine 40 (*)     Blood, Urine LARGE (*)     Protein, UA >=300 (*)     Nitrite, Urine POSITIVE (*)     Leukocyte Esterase, Urine MODERATE (*)     All other components within normal limits    Narrative:     Performed at:  [facility] [address] [address] [employer]   Phone [phone]   MICROSCOPIC URINALYSIS - Abnormal; Notable for the following components:    WBC, UA 24 (*)     RBC, UA >900 (*)     All other components within normal limits    Narrative:     Performed at:  [facility] [address] [address] [employer]   Phone [phone]   CULTURE, URINE   LIPASE    Narrative:     Performed at:  [facility] [address], [place], [address] [employer]   Phone [phone]       When ordered only abnormal lab results are displayed. All other labs were within normal range or not returned as of this dictation. EKG:  When ordered, EKG's are interpreted by the Emergency Department Physician in the absence of a cardiologist.  Please see their note for interpretation of pelvis showed 1 mm stone to the distal left ureter with left-sided hydronephrosis. No stones in the remaining left kidney. Urinalysis did show greater than 900 red blood cells and 24 white blood cells. Patient denies any dysuria but given the white blood cells in the urine we will treat with antibiotics empirically. Will discharge home with close follow-up by urology. Will give Ceftin for home. Close return precautions for worsening pain, nausea/vomiting, fever or inability to handle oral intake. Patient will be given Zofran, Norco and Flomax for home. Here in the emergency department he was given morphine, Zofran and fluids. Continued pain and was given dose of Toradol. After found out he had a stone given dose of Flomax and Norco.  Upon repeat evaluation patient states pain is completely subsided. Will discharge home with close return precautions. Low suspicion for infected stone, pyelonephritis, sepsis, HARPREET, surgical abdomen or other emergent etiology at this time. FINAL IMPRESSION      1. Left flank pain    2. Ureterolithiasis          DISPOSITION/PLAN   DISPOSITION Decision To Discharge 09/20/2021 11:48:24 AM      PATIENT REFERRED TO:  Felicia Buerger, MD  [address]  [phone]    Schedule an appointment as soon as possible for a visit   For a Re-check in  7-10    days. [facility] Emergency Department  [address] [facility]  [phone]  Go to   As needed, If symptoms worsen      DISCHARGE MEDICATIONS:  New Prescriptions    CEFUROXIME (CEFTIN) 250 MG TABLET    Take 1 tablet by mouth 2 times daily for 7 days    HYDROCODONE-ACETAMINOPHEN (NORCO) 5-325 MG PER TABLET    Take 1 tablet by mouth every 6 hours as needed for Pain for up to 3 days.     ONDANSETRON (ZOFRAN ODT) 4 MG DISINTEGRATING TABLET    Take 1 tablet by mouth every 8 hours as needed for Nausea    TAMSULOSIN (FLOMAX) 0.4 MG CAPSULE    Take 1 capsule by mouth daily for 4 doses DISCONTINUED MEDICATIONS:  Discontinued Medications    CYCLOBENZAPRINE (FLEXERIL) 10 MG TABLET    Take 1 tablet by mouth 3 times daily as needed for Muscle spasms    DICYCLOMINE (BENTYL) 10 MG CAPSULE    Take 1 capsule by mouth 3 times daily as needed (bowel spasms)    FAMOTIDINE (PEPCID) 20 MG TABLET    Take 1 tablet by mouth 2 times daily    ONDANSETRON (ZOFRAN ODT) 4 MG DISINTEGRATING TABLET    Take 1 tablet by mouth every 8 hours as needed for Nausea or Vomiting              (Please note that portions of this note were completed with a voice recognition program.  Efforts were made to edit the dictations but occasionally words are mis-transcribed.)    WAI Cummings (electronically signed)          WAI Rayn  09/20/21 4710

## 2021-09-21 LAB — URINE CULTURE, ROUTINE: NORMAL

## 2022-06-24 NOTE — PROGRESS NOTES
ENDOSCOPY PREOP INSTRUCTIONS       You are scheduled for a procedure at [facility] on 6-27 @ 830.  You will need to arrival by: 700 (at least an hour & a half prior to planned start time)   Report to the MAIN entrance on [address] and register at the information desk on the left-hand side of the lobby   You will need your insurance & photo ID with you. For your procedure:      PLEASE FOLLOW ALL INSTRUCTIONS & PREPS GIVEN TO YOU FROM YOUR DOCTOR'S OFFICE.  If you have not received these instructions yet, please call the office immediately. Make sure to read them as soon as received.  Bring an accurate list of any medications, including the dose/ frequency, with you on the day of the procedure. Make sure to include over the counter medications.  If you are taking blood thinners, Aspirin or diabetic medication, make sure to call your doctor as soon as possible for instructions prior to your procedure.  Please dress comfortably and do not wear any lotion, powders or jewelry   Arrange for someone to be with you and sign you out & drive you home after your procedure.  We allow 2 visitors with you in the hospital & both of you are required to be masked.      WOMEN ONLY OF CHILDBEARING AGE: Please make sure to be able to give a urine sample on arrival      If you have further questions, you may contact your Endoscopist's office or Pre Admission Testing staff at [phone]  Maria Del Carmen Rodrigez.6/24/2022 .9:31 AM

## 2022-06-27 ENCOUNTER — ANESTHESIA EVENT (OUTPATIENT)
Dept: ENDOSCOPY | Age: 42
End: 2022-06-27
Payer: COMMERCIAL

## 2022-06-27 ENCOUNTER — HOSPITAL ENCOUNTER (OUTPATIENT)
Age: 42
Setting detail: OUTPATIENT SURGERY
Discharge: HOME | End: 2022-06-27
Attending: INTERNAL MEDICINE | Admitting: INTERNAL MEDICINE
Payer: COMMERCIAL

## 2022-06-27 ENCOUNTER — ANESTHESIA (OUTPATIENT)
Dept: ENDOSCOPY | Age: 42
End: 2022-06-27
Payer: COMMERCIAL

## 2022-06-27 VITALS
WEIGHT: 215 LBS | OXYGEN SATURATION: 100 % | SYSTOLIC BLOOD PRESSURE: 117 MMHG | TEMPERATURE: 97.1 F | DIASTOLIC BLOOD PRESSURE: 87 MMHG | RESPIRATION RATE: 18 BRPM | HEART RATE: 59 BPM | BODY MASS INDEX: 30.1 KG/M2 | HEIGHT: 71 IN

## 2022-06-27 DIAGNOSIS — Z12.11 COLON CANCER SCREENING: ICD-10-CM

## 2022-06-27 LAB
GLUCOSE BLD-MCNC: 85 MG/DL (ref 70–99)
PERFORMED ON: NORMAL

## 2022-06-27 PROCEDURE — 7100000010 HC PHASE II RECOVERY - FIRST 15 MIN: Performed by: INTERNAL MEDICINE

## 2022-06-27 PROCEDURE — 6360000002 HC RX W HCPCS: Performed by: NURSE ANESTHETIST, CERTIFIED REGISTERED

## 2022-06-27 PROCEDURE — 3609010600 HC COLONOSCOPY POLYPECTOMY SNARE/COLD BIOPSY: Performed by: INTERNAL MEDICINE

## 2022-06-27 PROCEDURE — 2500000003 HC RX 250 WO HCPCS: Performed by: NURSE ANESTHETIST, CERTIFIED REGISTERED

## 2022-06-27 PROCEDURE — 2709999900 HC NON-CHARGEABLE SUPPLY: Performed by: INTERNAL MEDICINE

## 2022-06-27 PROCEDURE — 2580000003 HC RX 258: Performed by: ANESTHESIOLOGY

## 2022-06-27 PROCEDURE — 88305 TISSUE EXAM BY PATHOLOGIST: CPT

## 2022-06-27 PROCEDURE — 3700000001 HC ADD 15 MINUTES (ANESTHESIA): Performed by: INTERNAL MEDICINE

## 2022-06-27 PROCEDURE — 3700000000 HC ANESTHESIA ATTENDED CARE: Performed by: INTERNAL MEDICINE

## 2022-06-27 PROCEDURE — 7100000011 HC PHASE II RECOVERY - ADDTL 15 MIN: Performed by: INTERNAL MEDICINE

## 2022-06-27 RX ORDER — PROPOFOL 10 MG/ML
INJECTION, EMULSION INTRAVENOUS CONTINUOUS PRN
Status: DISCONTINUED | OUTPATIENT
Start: 2022-06-27 | End: 2022-06-27 | Stop reason: SDUPTHER

## 2022-06-27 RX ORDER — SODIUM CHLORIDE 9 MG/ML
INJECTION, SOLUTION INTRAVENOUS PRN
Status: DISCONTINUED | OUTPATIENT
Start: 2022-06-27 | End: 2022-06-27 | Stop reason: HOSPADM

## 2022-06-27 RX ORDER — SODIUM CHLORIDE 0.9 % (FLUSH) 0.9 %
5-40 SYRINGE (ML) INJECTION PRN
Status: DISCONTINUED | OUTPATIENT
Start: 2022-06-27 | End: 2022-06-27 | Stop reason: HOSPADM

## 2022-06-27 RX ORDER — PROPOFOL 10 MG/ML
INJECTION, EMULSION INTRAVENOUS PRN
Status: DISCONTINUED | OUTPATIENT
Start: 2022-06-27 | End: 2022-06-27 | Stop reason: SDUPTHER

## 2022-06-27 RX ORDER — LIDOCAINE HYDROCHLORIDE 10 MG/ML
1 INJECTION, SOLUTION EPIDURAL; INFILTRATION; INTRACAUDAL; PERINEURAL
Status: DISCONTINUED | OUTPATIENT
Start: 2022-06-27 | End: 2022-06-27 | Stop reason: HOSPADM

## 2022-06-27 RX ORDER — MIDAZOLAM HYDROCHLORIDE 1 MG/ML
INJECTION INTRAMUSCULAR; INTRAVENOUS PRN
Status: DISCONTINUED | OUTPATIENT
Start: 2022-06-27 | End: 2022-06-27 | Stop reason: SDUPTHER

## 2022-06-27 RX ORDER — SODIUM CHLORIDE, SODIUM LACTATE, POTASSIUM CHLORIDE, CALCIUM CHLORIDE 600; 310; 30; 20 MG/100ML; MG/100ML; MG/100ML; MG/100ML
INJECTION, SOLUTION INTRAVENOUS CONTINUOUS
Status: DISCONTINUED | OUTPATIENT
Start: 2022-06-27 | End: 2022-06-27 | Stop reason: HOSPADM

## 2022-06-27 RX ORDER — LIDOCAINE HYDROCHLORIDE 20 MG/ML
INJECTION, SOLUTION EPIDURAL; INFILTRATION; INTRACAUDAL; PERINEURAL PRN
Status: DISCONTINUED | OUTPATIENT
Start: 2022-06-27 | End: 2022-06-27 | Stop reason: SDUPTHER

## 2022-06-27 RX ORDER — SODIUM CHLORIDE 0.9 % (FLUSH) 0.9 %
5-40 SYRINGE (ML) INJECTION EVERY 12 HOURS SCHEDULED
Status: DISCONTINUED | OUTPATIENT
Start: 2022-06-27 | End: 2022-06-27 | Stop reason: HOSPADM

## 2022-06-27 RX ADMIN — MIDAZOLAM HYDROCHLORIDE 2 MG: 2 INJECTION, SOLUTION INTRAMUSCULAR; INTRAVENOUS at 08:46

## 2022-06-27 RX ADMIN — PROPOFOL 100 MG: 10 INJECTION, EMULSION INTRAVENOUS at 08:47

## 2022-06-27 RX ADMIN — LIDOCAINE HYDROCHLORIDE 60 MG: 20 INJECTION, SOLUTION EPIDURAL; INFILTRATION; INTRACAUDAL; PERINEURAL at 08:47

## 2022-06-27 RX ADMIN — SODIUM CHLORIDE, POTASSIUM CHLORIDE, SODIUM LACTATE AND CALCIUM CHLORIDE: 600; 310; 30; 20 INJECTION, SOLUTION INTRAVENOUS at 08:22

## 2022-06-27 RX ADMIN — PROPOFOL 150 MCG/KG/MIN: 10 INJECTION, EMULSION INTRAVENOUS at 08:47

## 2022-06-27 ASSESSMENT — PAIN - FUNCTIONAL ASSESSMENT: PAIN_FUNCTIONAL_ASSESSMENT: 0-10

## 2022-06-27 ASSESSMENT — PAIN SCALES - GENERAL
PAINLEVEL_OUTOF10: 0

## 2022-06-27 ASSESSMENT — LIFESTYLE VARIABLES: SMOKING_STATUS: 1

## 2022-06-27 NOTE — PROGRESS NOTES
Ambulatory Surgery/Procedure Discharge Note    Vitals:    06/27/22 0943   BP: 117/87   Pulse: 59   Resp: 18   Temp:    SpO2: 100%       In: 325 [I.V.:325]  Out: -     Restroom use offered before discharge. Yes    Pain assessment:  level of pain (1-10, 10 severe)  Pain Level: 0        Patient discharged to home/self care.  Patient discharged via wheel chair by transporter to waiting family/S.O.       6/27/2022 9:58 AM

## 2022-06-27 NOTE — ANESTHESIA PRE PROCEDURE
Department of Anesthesiology  Preprocedure Note       Name:  Maddy Quintana   Age:  42 y.o.  :  1980                                          MRN:  [id number]         Date:  2022      Surgeon: Libby Jeffrey):  Vera Dangelo MD    Procedure: Procedure(s):  COLONOSCOPY    Medications prior to admission:   Prior to Admission medications    Medication Sig Start Date End Date Taking? Authorizing Provider   lidocaine (LIDODERM) 5 % Place 1 patch onto the skin daily 12 hours on, 12 hours off. 3/22/20   WAI Cuello   dicyclomine (BENTYL) 10 MG capsule Take 1 capsule by mouth 3 times daily as needed (bowel spasms) 18  Magdaleno Caballero MD   famotidine (PEPCID) 20 MG tablet Take 1 tablet by mouth 2 times daily 18  Magdaleno Caballero MD       Current medications:    Current Facility-Administered Medications   Medication Dose Route Frequency Provider Last Rate Last Admin    lidocaine PF 1 % injection 1 mL  1 mL IntraDERmal Once PRN Sidney Dupont MD        lactated ringers infusion   IntraVENous Continuous Sidney Dupont  mL/hr at 22 0822 New Bag at 22 0258    sodium chloride flush 0.9 % injection 5-40 mL  5-40 mL IntraVENous 2 times per day Sidney Dupont MD        sodium chloride flush 0.9 % injection 5-40 mL  5-40 mL IntraVENous PRN Sidney Dupotn MD        0.9 % sodium chloride infusion   IntraVENous PRN Sidney Dupont MD           Allergies: Allergies   Allergen Reactions    Ultram [Tramadol] Nausea Only       Problem List:    Patient Active Problem List   Diagnosis Code    AVN (avascular necrosis of bone) ([facility] M87.00       Past Medical History:        Diagnosis Date    Avascular necrosis of bone ([address]     knees    Colon polyps     Diabetes mellitus ([address]     2022 pt denies any current diagnosis.  Hypoglycemic    External hemorrhoids     Hypoglycemia     Irritable bowel syndrome with both constipation and diarrhea Past Surgical History:        Procedure Laterality Date    FOOT FRACTURE SURGERY Left     WRIST FRACTURE SURGERY         Social History:    Social History     Tobacco Use    Smoking status: Current Every Day Smoker     Packs/day: 0.50     Years: 26.00     Pack years: 13.00     Types: Cigarettes    Smokeless tobacco: Never Used   Substance Use Topics    Alcohol use: Yes     Comment: socially                                Ready to quit: Not Answered  Counseling given: Not Answered      Vital Signs (Current):   Vitals:    06/27/22 0733   BP: 104/72   Pulse: 57   Resp: 15   Temp: 97.5 °F (36.4 °C)   TempSrc: Temporal   SpO2: 100%   Weight: 215 lb (97.5 kg)   Height: 5' 11\" (1.803 m)                                              BP Readings from Last 3 Encounters:   06/27/22 104/72   09/20/21 129/79   06/03/20 (!) 146/74       NPO Status: Time of last liquid consumption: 0130                        Time of last solid consumption: 1430                        Date of last liquid consumption: 06/27/22                        Date of last solid food consumption: 06/25/22    BMI:   Wt Readings from Last 3 Encounters:   06/27/22 215 lb (97.5 kg)   09/20/21 225 lb (102.1 kg)   06/03/20 225 lb (102.1 kg)     Body mass index is 29.99 kg/m².     CBC:   Lab Results   Component Value Date    WBC 14.8 09/20/2021    RBC 4.81 09/20/2021    HGB 15.5 09/20/2021    HCT 44.2 09/20/2021    MCV 91.9 09/20/2021    RDW 13.0 09/20/2021     09/20/2021       CMP:   Lab Results   Component Value Date     09/20/2021    K 3.6 09/20/2021     09/20/2021    CO2 23 09/20/2021    BUN 22 09/20/2021    CREATININE 1.1 09/20/2021    GFRAA >60 09/20/2021    AGRATIO 1.7 09/20/2021    LABGLOM >60 09/20/2021    GLUCOSE 143 09/20/2021    PROT 7.1 09/20/2021    CALCIUM 9.2 09/20/2021    BILITOT 0.9 09/20/2021    ALKPHOS 84 09/20/2021    AST 19 09/20/2021    ALT 15 09/20/2021       POC Tests: No results for input(s): POCGLUJORGE, SAURABH, POCCL, POCBUN, POCHEMO, POCHCT in the last 72 hours. Coags:   Lab Results   Component Value Date    PROTIME 12.9 08/20/2013    INR 1.0 08/20/2013    APTT 32.4 08/20/2013       HCG (If Applicable): No results found for: PREGTESTUR, PREGSERUM, HCG, HCGQUANT     ABGs: No results found for: PHART, PO2ART, HGR2GSC, JVT5AXV, BEART, R9DWNZVU     Type & Screen (If Applicable):  No results found for: LABABO, LABRH    Drug/Infectious Status (If Applicable):  No results found for: HIV, HEPCAB    COVID-19 Screening (If Applicable): No results found for: COVID19        Anesthesia Evaluation  Patient summary reviewed and Nursing notes reviewed no history of anesthetic complications:   Airway: Mallampati: I  TM distance: >3 FB   Neck ROM: full  Mouth opening: > = 3 FB   Dental: normal exam         Pulmonary:   (+) current smoker          Patient smoked on day of surgery. Cardiovascular:  Exercise tolerance: good (>4 METS),           Rhythm: regular  Rate: normal                    Neuro/Psych:   Negative Neuro/Psych ROS              GI/Hepatic/Renal:   (+) GERD:,           Endo/Other:    (+) Diabetes, . Abdominal:             Vascular: negative vascular ROS. Other Findings:           Anesthesia Plan      MAC     ASA 2       Induction: intravenous. Anesthetic plan and risks discussed with patient. Plan discussed with CRNA.                     Noemi Sierra DO   6/27/2022

## 2022-06-27 NOTE — OP NOTE
Colonoscopy Procedure Note    Patient: Kal Daly MRN: [id number]     YOB: 1980  Age: 42 y.o. Sex: male    Unit: [facility] ENDOSCOPY Room/Bed: [facility]/NONE Location: [address] [place] [address]       Colonoscopy with polypectomy (cold snare)    Admitting Physician: Carmen Perdomo     Primary Care Physician: Mikaela Hahn DO      Preoperative Diagnosis: Colon cancer screening [Z12.11]      DATE OF OPERATION: 6/27/2022    OPERATIVE SURGEON: Adan Hsieh MD      ANESTHESIA: Monitor Anesthesia Care      Procedure Details:    After informed consent was obtained with all risks and benefits of procedure explained and preoperative exam completed, the patient was taken to the endoscopy suite and placed in the left lateral decubitus position. Upon sequential sedation as per above, a digital rectal exam was performed and was normal.  The Olympus videocolonoscope  was inserted in the rectum and carefully advanced to the terminal ileum. Cecum Intubated : yes. The quality of preparation was good. The colonoscope was slowly withdrawn with careful evaluation between folds. Retroflexion in the rectum was performed. Estimated Blood Loss: minimal    Complications:  none    Findings:   Normal terminal ileum  polyp(s) #1, 5 mm in size, located in the descending colon removed by cold snare and retrieved for pathology    Plan: Await pathology results. Repeat colonoscopy in 5 years.         Signed By: Adan Hsieh MD

## 2022-06-27 NOTE — H&P
[facility] ENDOSCOPY  Outpatient Procedure H&P    Patient: Yovani Cortez MRN: [id number]     YOB: 1980  Age: 42 y.o. Sex: male    Unit: [facility] ENDOSCOPY Room/Bed: Endo Pool/NONE Location: [address] [place] [address]     Procedure: Procedure(s):  COLONOSCOPY    Indication: Colon cancer screening [Z12.11]    Referring  Physician:          Nurses past medical history notes reviewed and agreed. Medications reviewed. Allergies: Ultram [tramadol]     Allergies noted: Yes     Past Medical History:   Past Medical History:   Diagnosis Date    Avascular necrosis of bone ([facility] [address]     knees    Colon polyps     [facility]     6/27/2022 pt denies any current diagnosis.  Hypoglycemic    External hemorrhoids     Hypoglycemia     Irritable bowel syndrome with both constipation and diarrhea        Past Surgical History:   Past Surgical History:   Procedure Laterality Date    FOOT FRACTURE SURGERY Left     WRIST FRACTURE SURGERY         Social History:   Social History     Socioeconomic History    Marital status:      Spouse name: Not on file    Number of children: Not on file    Years of education: Not on file    Highest education level: Not on file   Occupational History    Not on file   Tobacco Use    Smoking status: Current Every Day Smoker     Packs/day: 0.50     Years: 26.00     Pack years: 13.00     Types: Cigarettes    Smokeless tobacco: Never Used   Vaping Use    Vaping Use: Never used   Substance and Sexual Activity    Alcohol use: Yes     Comment: socially    Drug use: No    Sexual activity: Yes     Partners: Female   Other Topics Concern    Not on file   Social History Narrative    Not on file     Social Determinants of Health     Financial Resource Strain:     Difficulty of Paying Living Expenses: Not on file   Food Insecurity:     Worried About Running Out of Food in the Last Year: Not on file    Francesco of Food in the Last Year: Not on file   Transportation Needs:  Lack of Transportation (Medical): Not on file    Lack of Transportation (Non-Medical): Not on file   Physical Activity:     Days of Exercise per Week: Not on file    Minutes of Exercise per Session: Not on file   Stress:     Feeling of Stress : Not on file   Social Connections:     Frequency of Communication with Friends and Family: Not on file    Frequency of Social Gatherings with Friends and Family: Not on file    Attends Yarsani Services: Not on file    Active Member of [address] [place] [address] or Organizations: Not on file    Attends Club or Organization Meetings: Not on file    Marital Status: Not on file   Intimate Partner Violence:     Fear of Current or Ex-Partner: Not on file    Emotionally Abused: Not on file    Physically Abused: Not on file    Sexually Abused: Not on file   Housing Stability:     Unable to Pay for Housing in the Last Year: Not on file    Number of [place] in the Last Year: Not on file    Unstable Housing in the Last Year: Not on file       Family History: History reviewed. No pertinent family history. Home Medications:   Prior to Admission medications    Medication Sig Start Date End Date Taking?  Authorizing Provider   lidocaine (LIDODERM) 5 % Place 1 patch onto the skin daily 12 hours on, 12 hours off. 3/22/20   WAI Lira   dicyclomine (BENTYL) 10 MG capsule Take 1 capsule by mouth 3 times daily as needed (bowel spasms) 12/11/18 9/20/21  Dandre Mitchell MD   famotidine (PEPCID) 20 MG tablet Take 1 tablet by mouth 2 times daily 12/11/18 9/20/21  Dandre Mitchell MD       Review of Systems:  Weight Loss: No  Dysphagia: No  Dyspepsia: No  Melena: no  Chest pain: no    Physical Exam:   Vital Signs: /72   Pulse 57   Temp 97.5 °F (36.4 °C) (Temporal)   Resp 15   Ht 5' 11\" (1.803 m)   Wt 215 lb (97.5 kg)   SpO2 100%   BMI 29.99 kg/m²   Vital signs reviewed:Yes    HEENT:Normal  Cardiac:Normal  Chest:Normal  Abdomen:Normal  Exts: Normal  Neuro:Normal    Labs:  Admission on 06/27/2022   Component Date Value Ref Range Status    POC Glucose 06/27/2022 85  70 - 99 mg/dl Final    Performed on 06/27/2022 ACCU-CHEK   Final        Imaging:  No orders to display       ASA:2    Mallampati Score: II    Sedation planned:MAC    Patient in acceptable condition for procedure: Yes    8:38 AM 6/27/2022    Osmany Lao MD      Please note that some or all of this record was generated using voice recognition software. If there are any questions about the content of this document, please contact the author as some errors in transcription may have occurred.

## 2023-01-12 ENCOUNTER — HOSPITAL ENCOUNTER (EMERGENCY)
Age: 43
Discharge: HOME | End: 2023-01-12
Payer: COMMERCIAL

## 2023-01-12 ENCOUNTER — APPOINTMENT (OUTPATIENT)
Dept: GENERAL RADIOLOGY | Age: 43
End: 2023-01-12
Payer: COMMERCIAL

## 2023-01-12 VITALS
WEIGHT: 220 LBS | DIASTOLIC BLOOD PRESSURE: 76 MMHG | RESPIRATION RATE: 18 BRPM | HEART RATE: 72 BPM | SYSTOLIC BLOOD PRESSURE: 129 MMHG | TEMPERATURE: 98.6 F | BODY MASS INDEX: 30.68 KG/M2 | OXYGEN SATURATION: 96 %

## 2023-01-12 DIAGNOSIS — M25.511 RIGHT ANTERIOR SHOULDER PAIN: Primary | ICD-10-CM

## 2023-01-12 PROCEDURE — 99283 EMERGENCY DEPT VISIT LOW MDM: CPT

## 2023-01-12 PROCEDURE — 73030 X-RAY EXAM OF SHOULDER: CPT

## 2023-01-12 RX ORDER — LIDOCAINE 50 MG/G
1 PATCH TOPICAL DAILY
Qty: 15 PATCH | Refills: 0 | Status: SHIPPED | OUTPATIENT
Start: 2023-01-12 | End: 2023-01-27

## 2023-01-12 RX ORDER — IBUPROFEN 600 MG/1
600 TABLET ORAL 3 TIMES DAILY PRN
Qty: 30 TABLET | Refills: 0 | Status: SHIPPED | OUTPATIENT
Start: 2023-01-12

## 2023-01-12 ASSESSMENT — ENCOUNTER SYMPTOMS
BACK PAIN: 0
VOMITING: 0
ABDOMINAL PAIN: 0
SHORTNESS OF BREATH: 0
COUGH: 0
CONSTIPATION: 0
NAUSEA: 0
RHINORRHEA: 0
DIARRHEA: 0
EYE PAIN: 0
SORE THROAT: 0

## 2023-01-12 NOTE — ED PROVIDER NOTES
[facility]        Pt Name: Gala Gibson  MRN: [id number]  [place] 1980  Date of evaluation: 1/12/2023  Provider: WAI Day  PCP: Dimitrios Dobson DO  Note Started: 3:55 PM EST 1/12/23      [place]. I have evaluated this patient. My supervising physician was available for consultation. CHIEF COMPLAINT       Chief Complaint   Patient presents with    Arm Injury     Pt injured R arm back in July while arm wrestling, states it has been hurting ever since. Has not been medically seen for this. HISTORY OF PRESENT ILLNESS: 1 or more Elements     History from : Patient    Limitations to history : None    Gala Gibson is a 36 y.o. male who presents to the emergency department due to right shoulder pain that has been going on since 22. Patient states that he was in a arm wrestling match with one of his friends and his arm went backwards and caused his pain. Patient states that over the next several months he had improvement in his pain but still maintained a dull ache in the anterior shoulder. Patient states that when he is working will flareup and get worse and sometimes affect his job performance. Patient denies any numbness or tingling or loss sensation down the arm. Patient has any chest pain or shortness of breath. Patient states that he will take Tylenol ibuprofen on occasion when it flares up. Patient states he has not followed up with any primary care doctors or urgent cares for this right shoulder pain. Nursing Notes were all reviewed and agreed with or any disagreements were addressed in the HPI. REVIEW OF SYSTEMS :      Review of Systems   Constitutional:  Negative for chills, diaphoresis and fever. HENT:  Negative for congestion, rhinorrhea and sore throat. Eyes:  Negative for pain and visual disturbance. Respiratory:  Negative for cough and shortness of breath.     Cardiovascular: Negative for chest pain and leg swelling. Gastrointestinal:  Negative for abdominal pain, constipation, diarrhea, nausea and vomiting. Genitourinary:  Negative for difficulty urinating, dysuria and frequency. Musculoskeletal:  Negative for back pain and neck pain. Right shoulder pain   Skin:  Negative for rash and wound. Neurological:  Negative for dizziness and light-headedness. Positives and Pertinent negatives as per HPI. SURGICAL HISTORY     Past Surgical History:   Procedure Laterality Date    COLONOSCOPY N/A 6/27/2022    COLONOSCOPY POLYPECTOMY SNARE/COLD BIOPSY performed by Eddie Alston MD at [place] [address]       Previous Medications    No medications on file       ALLERGIES     Ultram [tramadol]    FAMILYHISTORY     History reviewed. No pertinent family history. SOCIAL HISTORY       Social History     Tobacco Use    Smoking status: Every Day     Packs/day: 0.50     Years: 26.00     Pack years: 13.00     Types: Cigarettes    Smokeless tobacco: Never   Vaping Use    Vaping Use: Never used   Substance Use Topics    Alcohol use: Yes     Comment: socially    Drug use: No       SCREENINGS        Danese Coma Scale  Eye Opening: Spontaneous  Best Verbal Response: Oriented  Best Motor Response: Obeys commands  Fox Coma Scale Score: 15                CIWA Assessment  BP: 129/76  Heart Rate: 72           PHYSICAL EXAM  1 or more Elements     ED Triage Vitals [01/12/23 1517]   BP Temp Temp Source Heart Rate Resp SpO2 Height Weight   129/76 98.6 °F (37 °C) Oral 72 18 96 % -- 220 lb (99.8 kg)       Physical Exam  Vitals and nursing note reviewed. HENT:      Head: Normocephalic. Cardiovascular:      Rate and Rhythm: Normal rate and regular rhythm. Pulses: Normal pulses. Heart sounds: Normal heart sounds.    Pulmonary:      Effort: Pulmonary effort is normal.      Breath sounds: Normal breath sounds. Musculoskeletal:      Cervical back: Normal range of motion and neck supple. Comments: Right shoulder: negative Neer's positive Watkins impingement test negative empty can test, negative liftoff test positive tenderness over the bicipital groove with Yergascon test.  Negative speeds test.    Patient has gross sensation to light touch throughout the entire right arm. Bilateral radial pulses equal intact 2+. There is no rashes or vesicular lesions or ecchymosis noted about the skin. Neurological:      Mental Status: He is alert. Psychiatric:         Mood and Affect: Mood normal.         Behavior: Behavior normal.           DIAGNOSTIC RESULTS   LABS:    Labs Reviewed - No data to display    When ordered only abnormal lab results are displayed. All other labs were within normal range or not returned as of this dictation. EKG: When ordered, EKG's are interpreted by the Emergency Department Physician in the absence of a cardiologist.  Please see their note for interpretation of EKG. RADIOLOGY:   Non-plain film images such as CT, Ultrasound and MRI are read by the radiologist. Plain radiographic images are visualized and preliminarily interpreted by the ED Provider with the below findings:        Interpretation per the Radiologist below, if available at the time of this note:    XR SHOULDER RIGHT (MIN 2 VIEWS)   Final Result   No acute abnormality. XR SHOULDER RIGHT (MIN 2 VIEWS)    Result Date: 1/12/2023  EXAMINATION: THREE XRAY VIEWS OF THE RIGHT SHOULDER 1/12/2023 3:37 pm COMPARISON: None. HISTORY: ORDERING SYSTEM PROVIDED HISTORY: Pain TECHNOLOGIST PROVIDED HISTORY: Reason for exam:->Pain Reason for Exam: Pain FINDINGS: Glenohumeral joint is normally aligned. No evidence of acute fracture or dislocation. No abnormal periarticular calcifications. [facility] [place] joint is unremarkable in appearance. Visualized lung is unremarkable. No acute abnormality.        No results found.    PROCEDURES   Unless otherwise noted below, none     Procedures    CRITICAL CARE TIME (.cctime)       PAST MEDICAL HISTORY      has a past medical history of Avascular necrosis of bone ([facility] [address] [address] Colon polyps, Diabetes mellitus ([facility] [facility] External hemorrhoids, Hypoglycemia, and Irritable bowel syndrome with both constipation and diarrhea. Chronic Conditions affecting Care:     EMERGENCY DEPARTMENT COURSE and DIFFERENTIAL DIAGNOSIS/MDM:   Vitals:    Vitals:    01/12/23 1517   BP: 129/76   Pulse: 72   Resp: 18   Temp: 98.6 °F (37 °C)   TempSrc: Oral   SpO2: 96%   Weight: 220 lb (99.8 kg)       Patient was given the following medications:  Medications - No data to display          Is this patient to be included in the SEP-1 Core Measure due to severe sepsis or septic shock? No   Exclusion criteria - the patient is NOT to be included for SEP-1 Core Measure due to: Infection is not suspected    CONSULTS: (Who and What was discussed)  None  Discussion with Other Profesionals : None    Social Determinants : None    Records Reviewed : None    CC/HPI Summary, DDx, ED Course, and Reassessment: 25-year-old male presents emerged department due to right shoulder pain that has been going on since July 2022 after being an arm wrestling events. On exam patient otherwise appears well in no acute distress she has normal sensation to touch throughout the arm. He has good  strength today. Patient does have some findings on exam suggestive of rotator cuff or bicep tendon injury. Bilateral radial pulses equal intact 2+. Patient has full range of motion of the shoulder without any difficulty. X-ray of the shoulder did not reveal any osseous abnormalities. Patient will follow up with orthopedics for further evaluation possibly needing MRI for physical therapy. Will discharge with anti-inflammatory medication and instructions on limiting activity as much as possible.     Low suspicion for septic arthritis, gout, reactive arthritis, fracture, dislocation or other acute emergent etiologies at this time. Disposition Considerations (include 1 Tests not done, Shared Decision Making, Pt Expectation of Test or Tx.):   Appropriate for outpatient management        I am the Primary Clinician of Record. FINAL IMPRESSION      1.  Right anterior shoulder pain          DISPOSITION/PLAN     DISPOSITION Decision To Discharge 01/12/2023 04:04:39 PM      PATIENT REFERRED TO:  Edmund Schmitz, DO  [address]  [phone]    Schedule an appointment as soon as possible for a visit   recheck    [facility] Emergency Department  [address] [place] [address]  [phone]    As needed, If symptoms worsen    Eliu Nails MD  [address] [facility]  [phone]    Schedule an appointment as soon as possible for a visit in 1 week  recheck    DISCHARGE MEDICATIONS:  New Prescriptions    IBUPROFEN (ADVIL;MOTRIN) 600 MG TABLET    Take 1 tablet by mouth 3 times daily as needed for Pain       DISCONTINUED MEDICATIONS:  Discontinued Medications    No medications on file              (Please note that portions of this note were completed with a voice recognition program.  Efforts were made to edit the dictations but occasionally words are mis-transcribed.)    WAI Thornton (electronically signed)           WAI Thornton  01/12/23 0168

## 2023-01-12 NOTE — DISCHARGE INSTRUCTIONS
Follow-up with orthopedics next week for recheck    Continue take Tylenol and ibuprofen for pain as needed you can also apply Lidoderm patch over the area for pain.     Return to the emergency room if you have any chest pain, shortness of breath difficulty breathing or worsening pain

## 2023-01-12 NOTE — Clinical Note
Nicky Martin was seen and treated in our emergency department on 1/12/2023. He may return to work on 01/13/2023. If you have any questions or concerns, please don't hesitate to call.       WAI Rivas

## 2023-03-31 ENCOUNTER — HOSPITAL ENCOUNTER (EMERGENCY)
Age: 43
Discharge: HOME | End: 2023-03-31
Attending: EMERGENCY MEDICINE
Payer: COMMERCIAL

## 2023-03-31 VITALS
TEMPERATURE: 98.3 F | OXYGEN SATURATION: 98 % | RESPIRATION RATE: 16 BRPM | HEART RATE: 58 BPM | SYSTOLIC BLOOD PRESSURE: 126 MMHG | DIASTOLIC BLOOD PRESSURE: 83 MMHG

## 2023-03-31 DIAGNOSIS — J02.9 SORE THROAT: Primary | ICD-10-CM

## 2023-03-31 LAB — S PYO AG THROAT QL: NEGATIVE

## 2023-03-31 PROCEDURE — 87081 CULTURE SCREEN ONLY: CPT

## 2023-03-31 PROCEDURE — 87880 STREP A ASSAY W/OPTIC: CPT

## 2023-03-31 PROCEDURE — 99283 EMERGENCY DEPT VISIT LOW MDM: CPT

## 2023-03-31 ASSESSMENT — PAIN SCALES - GENERAL: PAINLEVEL_OUTOF10: 1

## 2023-03-31 ASSESSMENT — PAIN - FUNCTIONAL ASSESSMENT: PAIN_FUNCTIONAL_ASSESSMENT: 0-10

## 2023-03-31 NOTE — DISCHARGE INSTRUCTIONS
Please return if you have any new, worsening, or concerning symptoms like inability to eat/drink/walk, fevers, trouble breathing    Contact your primary care physician tomorrow to make a follow up appointment

## 2023-03-31 NOTE — ED PROVIDER NOTES
[address] PROVIDER NOTE    Patient Identification  Pt Name: Val Guerrero  MRN: [id number]  [place] 1980  Date of evaluation: 3/31/2023  Provider: Michael Madden MD  PCP: Aubrey Goetz DO    Chief Complaint  Pharyngitis (Pt states that he has a sore throat for a few days now. Pt states very irritated throat. )      HPI  History provided by patient   This is a 36 y.o. male who presents to the ED for sore throat. Ongoing for the past few days. No sick contacts. Normal range of motion of his neck. No problems opening his mouth. No difficulty swallowing. No fevers or chills or cough. States that he simply feels scratchy and he decided to come get it checked out. ROS  12 systems reviewed, pertinent positives/negatives per HPI otherwise noted to be negative. I have reviewed the following nursing documentation:  Allergies: Ultram [tramadol]    Past medical history:   Past Medical History:   Diagnosis Date    Avascular necrosis of bone ([address]     knees    Colon polyps     Diabetes mellitus ([facility]     6/27/2022 pt denies any current diagnosis. Hypoglycemic    External hemorrhoids     Hypoglycemia     Irritable bowel syndrome with both constipation and diarrhea      Past surgical history:   Past Surgical History:   Procedure Laterality Date    COLONOSCOPY N/A 6/27/2022    COLONOSCOPY POLYPECTOMY SNARE/COLD BIOPSY performed by Angeles Bowman MD at [place] [address] Left     WRIST FRACTURE SURGERY         Home medications:   Previous Medications    IBUPROFEN (ADVIL;MOTRIN) 600 MG TABLET    Take 1 tablet by mouth 3 times daily as needed for Pain       Social history:  reports that he has been smoking cigarettes. He has a 13.00 pack-year smoking history. He has never used smokeless tobacco. He reports current alcohol use. He reports that he does not use drugs. Family history:  No family history on file.       Exam  ED Triage Vitals [03/31/23 4276]

## 2023-04-02 LAB — S PYO THROAT QL CULT: NORMAL

## 2023-04-15 ENCOUNTER — HOSPITAL ENCOUNTER (EMERGENCY)
Age: 43
Discharge: HOME | End: 2023-04-15
Attending: EMERGENCY MEDICINE
Payer: COMMERCIAL

## 2023-04-15 VITALS
HEART RATE: 76 BPM | TEMPERATURE: 98.4 F | HEIGHT: 70 IN | DIASTOLIC BLOOD PRESSURE: 89 MMHG | OXYGEN SATURATION: 97 % | WEIGHT: 240.2 LBS | SYSTOLIC BLOOD PRESSURE: 143 MMHG | BODY MASS INDEX: 34.39 KG/M2 | RESPIRATION RATE: 20 BRPM

## 2023-04-15 DIAGNOSIS — J06.9 ACUTE UPPER RESPIRATORY INFECTION: Primary | ICD-10-CM

## 2023-04-15 LAB
FLUAV RNA UPPER RESP QL NAA+PROBE: NEGATIVE
FLUBV AG NPH QL: NEGATIVE
S PYO AG THROAT QL: NEGATIVE
SARS-COV-2 RDRP RESP QL NAA+PROBE: NOT DETECTED

## 2023-04-15 PROCEDURE — 87880 STREP A ASSAY W/OPTIC: CPT

## 2023-04-15 PROCEDURE — 6360000002 HC RX W HCPCS: Performed by: EMERGENCY MEDICINE

## 2023-04-15 PROCEDURE — 87804 INFLUENZA ASSAY W/OPTIC: CPT

## 2023-04-15 PROCEDURE — 87081 CULTURE SCREEN ONLY: CPT

## 2023-04-15 PROCEDURE — 6370000000 HC RX 637 (ALT 250 FOR IP): Performed by: EMERGENCY MEDICINE

## 2023-04-15 PROCEDURE — 99283 EMERGENCY DEPT VISIT LOW MDM: CPT

## 2023-04-15 PROCEDURE — 87635 SARS-COV-2 COVID-19 AMP PRB: CPT

## 2023-04-15 RX ORDER — DEXAMETHASONE SODIUM PHOSPHATE 4 MG/ML
10 INJECTION, SOLUTION INTRA-ARTICULAR; INTRALESIONAL; INTRAMUSCULAR; INTRAVENOUS; SOFT TISSUE ONCE
Status: COMPLETED | OUTPATIENT
Start: 2023-04-15 | End: 2023-04-15

## 2023-04-15 RX ORDER — BENZONATATE 100 MG/1
100 CAPSULE ORAL 3 TIMES DAILY PRN
Qty: 21 CAPSULE | Refills: 0 | Status: SHIPPED | OUTPATIENT
Start: 2023-04-15 | End: 2023-04-22

## 2023-04-15 RX ORDER — BENZONATATE 100 MG/1
100 CAPSULE ORAL ONCE
Status: COMPLETED | OUTPATIENT
Start: 2023-04-15 | End: 2023-04-15

## 2023-04-15 RX ADMIN — DEXAMETHASONE SODIUM PHOSPHATE 10 MG: 4 INJECTION, SOLUTION INTRA-ARTICULAR; INTRALESIONAL; INTRAMUSCULAR; INTRAVENOUS; SOFT TISSUE at 23:07

## 2023-04-15 RX ADMIN — BENZONATATE 100 MG: 100 CAPSULE ORAL at 23:06

## 2023-04-17 LAB — S PYO THROAT QL CULT: NORMAL

## 2023-05-25 ENCOUNTER — HOSPITAL ENCOUNTER (EMERGENCY)
Age: 43
Discharge: HOME | End: 2023-05-25
Payer: COMMERCIAL

## 2023-05-25 ENCOUNTER — APPOINTMENT (OUTPATIENT)
Dept: GENERAL RADIOLOGY | Age: 43
End: 2023-05-25
Payer: COMMERCIAL

## 2023-05-25 VITALS
TEMPERATURE: 98.1 F | HEART RATE: 76 BPM | HEIGHT: 71 IN | RESPIRATION RATE: 16 BRPM | BODY MASS INDEX: 33.46 KG/M2 | SYSTOLIC BLOOD PRESSURE: 125 MMHG | OXYGEN SATURATION: 97 % | DIASTOLIC BLOOD PRESSURE: 74 MMHG | WEIGHT: 239 LBS

## 2023-05-25 DIAGNOSIS — S93.601A SPRAIN OF RIGHT FOOT, INITIAL ENCOUNTER: Primary | ICD-10-CM

## 2023-05-25 PROCEDURE — 73630 X-RAY EXAM OF FOOT: CPT

## 2023-05-25 PROCEDURE — 99283 EMERGENCY DEPT VISIT LOW MDM: CPT

## 2023-05-25 ASSESSMENT — PAIN - FUNCTIONAL ASSESSMENT: PAIN_FUNCTIONAL_ASSESSMENT: 0-10

## 2023-05-25 ASSESSMENT — PAIN DESCRIPTION - LOCATION: LOCATION: FOOT

## 2023-05-25 ASSESSMENT — PAIN SCALES - GENERAL: PAINLEVEL_OUTOF10: 5

## 2023-05-25 ASSESSMENT — PAIN DESCRIPTION - ORIENTATION: ORIENTATION: RIGHT

## 2023-05-25 ASSESSMENT — ENCOUNTER SYMPTOMS: RESPIRATORY NEGATIVE: 1

## 2023-05-25 NOTE — ED PROVIDER NOTES
[address] [facility]        Pt Name: Baljinder Cuba  MRN: [id number]  [place] 1980  Date of evaluation: 5/25/2023  Provider: Kristyn Moya PA-C  PCP: Reshma Bishop MD  Note Started: 10:40 AM EDT 5/25/23      EMERSON. I have evaluated this patient. CHIEF COMPLAINT       Chief Complaint   Patient presents with    Foot Pain     Playing soccer yesterday denies any injury, today there is bruising to top and side of rt foot with pain on ambulation       HISTORY OF PRESENT ILLNESS: 1 or more Elements     History From: patient  Limitations to history : None    Baljinder Cuba is a 36 y.o. male who presents complaining of right foot pain and bruising. Patient reports yesterday he was playing kickball with [employer] and today woke up with swelling, bruising and pain to the right midfoot on the medial side. He denies any known trauma, states he was feeling well yesterday is now having pain with walking. Denies prior injury to the foot, other injuries, weakness, paresthesia, wounds, redness. Nursing Notes were all reviewed and agreed with or any disagreements were addressed in the HPI. REVIEW OF SYSTEMS :      Review of Systems   Constitutional: Negative. Respiratory: Negative. Cardiovascular: Negative. Musculoskeletal: Negative. Skin: Negative. Neurological: Negative. Positives and Pertinent negatives as per HPI. PAST MEDICAL HISTORY    has a past medical history of Avascular necrosis of bone ([address] Colon polyps, Diabetes mellitus ([address] External hemorrhoids, Hypoglycemia, and Irritable bowel syndrome with both constipation and diarrhea.      SURGICAL HISTORY     Past Surgical History:   Procedure Laterality Date    COLONOSCOPY N/A 6/27/2022    COLONOSCOPY POLYPECTOMY SNARE/COLD BIOPSY performed by Dutch Barrera MD at / [place] [address] Left     WRIST FRACTURE SURGERY

## 2023-12-11 ENCOUNTER — APPOINTMENT (OUTPATIENT)
Dept: GENERAL RADIOLOGY | Age: 43
End: 2023-12-11
Payer: COMMERCIAL

## 2023-12-11 ENCOUNTER — HOSPITAL ENCOUNTER (EMERGENCY)
Age: 43
Discharge: HOME | End: 2023-12-11
Attending: EMERGENCY MEDICINE
Payer: COMMERCIAL

## 2023-12-11 VITALS
WEIGHT: 256.9 LBS | HEART RATE: 75 BPM | HEIGHT: 70 IN | RESPIRATION RATE: 18 BRPM | OXYGEN SATURATION: 98 % | TEMPERATURE: 98.5 F | DIASTOLIC BLOOD PRESSURE: 80 MMHG | BODY MASS INDEX: 36.78 KG/M2 | SYSTOLIC BLOOD PRESSURE: 161 MMHG

## 2023-12-11 DIAGNOSIS — J06.9 UPPER RESPIRATORY TRACT INFECTION, UNSPECIFIED TYPE: Primary | ICD-10-CM

## 2023-12-11 LAB
FLUAV RNA UPPER RESP QL NAA+PROBE: NEGATIVE
FLUBV AG NPH QL: NEGATIVE
SARS-COV-2 RDRP RESP QL NAA+PROBE: NOT DETECTED

## 2023-12-11 PROCEDURE — 6370000000 HC RX 637 (ALT 250 FOR IP): Performed by: PHYSICIAN ASSISTANT

## 2023-12-11 PROCEDURE — 71045 X-RAY EXAM CHEST 1 VIEW: CPT

## 2023-12-11 PROCEDURE — 99284 EMERGENCY DEPT VISIT MOD MDM: CPT

## 2023-12-11 PROCEDURE — 87635 SARS-COV-2 COVID-19 AMP PRB: CPT

## 2023-12-11 PROCEDURE — 87804 INFLUENZA ASSAY W/OPTIC: CPT

## 2023-12-11 RX ORDER — AMOXICILLIN AND CLAVULANATE POTASSIUM 875; 125 MG/1; MG/1
1 TABLET, FILM COATED ORAL ONCE
Status: COMPLETED | OUTPATIENT
Start: 2023-12-11 | End: 2023-12-11

## 2023-12-11 RX ORDER — FLUTICASONE PROPIONATE 50 MCG
2 SPRAY, SUSPENSION (ML) NASAL DAILY
Qty: 16 G | Refills: 0 | Status: SHIPPED | OUTPATIENT
Start: 2023-12-11

## 2023-12-11 RX ORDER — AMOXICILLIN AND CLAVULANATE POTASSIUM 875; 125 MG/1; MG/1
1 TABLET, FILM COATED ORAL 2 TIMES DAILY
Qty: 14 TABLET | Refills: 0 | Status: SHIPPED | OUTPATIENT
Start: 2023-12-11 | End: 2023-12-18

## 2023-12-11 RX ORDER — BENZONATATE 100 MG/1
100 CAPSULE ORAL 3 TIMES DAILY PRN
Qty: 30 CAPSULE | Refills: 0 | Status: SHIPPED | OUTPATIENT
Start: 2023-12-11 | End: 2023-12-21

## 2023-12-11 RX ADMIN — AMOXICILLIN AND CLAVULANATE POTASSIUM 1 TABLET: 875; 125 TABLET, FILM COATED ORAL at 21:08

## 2023-12-11 ASSESSMENT — LIFESTYLE VARIABLES
HOW OFTEN DO YOU HAVE A DRINK CONTAINING ALCOHOL: MONTHLY OR LESS
HOW MANY STANDARD DRINKS CONTAINING ALCOHOL DO YOU HAVE ON A TYPICAL DAY: 1 OR 2

## 2023-12-11 ASSESSMENT — PAIN - FUNCTIONAL ASSESSMENT: PAIN_FUNCTIONAL_ASSESSMENT: NONE - DENIES PAIN

## 2023-12-12 NOTE — ED PROVIDER NOTES
[facility]        Pt Name: Herminio Craft  MRN: [id number]  [address] [place] 1980  Date of evaluation: 12/11/2023  Provider: Liliana Saab PA-C  PCP: Chaparrita Huff MD  Note Started: 7:30 PM EST 12/11/23      EMERSON. I have evaluated this patient. CHIEF COMPLAINT       Chief Complaint   Patient presents with    Cough     Pt arrives to ED via self c/o facial pain, swelling, and pressure around nose, as well as a cough x 2 days. Pt states he had had this issue before. HISTORY OF PRESENT ILLNESS: 1 or more Elements     History From: patient    Herminio Craft is a 36 y.o. male who presents complaining of URI symptoms x 2 days. Patient reports sinus congestion, cough x 2 days. Over-the-counter medication use without relief. Denies sick contacts, objective fever, shortness of breath, chest pain, vomiting, diarrhea, rash, vision change, headache, neck pain/stiffness. States throat feels scratchy, denies throat pain or difficulty swallowing. Nursing Notes were all reviewed and agreed with or any disagreements were addressed in the HPI. REVIEW OF SYSTEMS :      Review of Systems   All other systems reviewed and are negative. Positives and Pertinent negatives as per HPI. PAST MEDICAL HISTORY    has a past medical history of Avascular necrosis of bone ([address]), Colon polyps, Diabetes mellitus ([address]), External hemorrhoids, Hypoglycemia, and Irritable bowel syndrome with both constipation and diarrhea.      SURGICAL HISTORY     Past Surgical History:   Procedure Laterality Date    COLONOSCOPY N/A 6/27/2022    COLONOSCOPY POLYPECTOMY SNARE/COLD BIOPSY performed by Sherry Singh MD at [facility]       Previous Medications    IBUPROFEN (ADVIL;MOTRIN) 600 MG TABLET    Take 1 tablet by mouth 3 times daily as needed for Pain

## 2023-12-30 ENCOUNTER — HOSPITAL ENCOUNTER (EMERGENCY)
Age: 43
Discharge: HOME | End: 2023-12-31
Attending: INTERNAL MEDICINE
Payer: COMMERCIAL

## 2023-12-30 ENCOUNTER — APPOINTMENT (OUTPATIENT)
Dept: CT IMAGING | Age: 43
End: 2023-12-30
Payer: COMMERCIAL

## 2023-12-30 VITALS
WEIGHT: 250 LBS | HEIGHT: 70 IN | TEMPERATURE: 98.3 F | HEART RATE: 89 BPM | BODY MASS INDEX: 35.79 KG/M2 | OXYGEN SATURATION: 98 % | RESPIRATION RATE: 18 BRPM | DIASTOLIC BLOOD PRESSURE: 93 MMHG | SYSTOLIC BLOOD PRESSURE: 157 MMHG

## 2023-12-30 DIAGNOSIS — N20.0 KIDNEY STONE ON LEFT SIDE: Primary | ICD-10-CM

## 2023-12-30 DIAGNOSIS — N28.9 ACUTE RENAL INSUFFICIENCY: ICD-10-CM

## 2023-12-30 LAB
ANION GAP SERPL CALCULATED.3IONS-SCNC: 7 MMOL/L (ref 3–16)
BACTERIA URNS QL MICRO: ABNORMAL /HPF
BASOPHILS # BLD: 0 K/UL (ref 0–0.2)
BASOPHILS NFR BLD: 0.4 %
BILIRUB UR QL STRIP.AUTO: NEGATIVE
BUN SERPL-MCNC: 19 MG/DL (ref 7–20)
CALCIUM SERPL-MCNC: 9.3 MG/DL (ref 8.3–10.6)
CHLORIDE SERPL-SCNC: 103 MMOL/L (ref 99–110)
CLARITY UR: CLEAR
CO2 SERPL-SCNC: 30 MMOL/L (ref 21–32)
COLOR UR: YELLOW
CREAT SERPL-MCNC: 1.4 MG/DL (ref 0.9–1.3)
DEPRECATED RDW RBC AUTO: 13.3 % (ref 12.4–15.4)
EOSINOPHIL # BLD: 0 K/UL (ref 0–0.6)
EOSINOPHIL NFR BLD: 0.3 %
EPI CELLS #/AREA URNS AUTO: 1 /HPF (ref 0–5)
GFR SERPLBLD CREATININE-BSD FMLA CKD-EPI: >60 ML/MIN/{1.73_M2}
GLUCOSE SERPL-MCNC: 102 MG/DL (ref 70–99)
GLUCOSE UR STRIP.AUTO-MCNC: NEGATIVE MG/DL
HCT VFR BLD AUTO: 40.9 % (ref 40.5–52.5)
HGB BLD-MCNC: 14.4 G/DL (ref 13.5–17.5)
HGB UR QL STRIP.AUTO: ABNORMAL
HYALINE CASTS #/AREA URNS AUTO: 7 /LPF (ref 0–8)
KETONES UR STRIP.AUTO-MCNC: ABNORMAL MG/DL
LEUKOCYTE ESTERASE UR QL STRIP.AUTO: NEGATIVE
LYMPHOCYTES # BLD: 1.1 K/UL (ref 1–5.1)
LYMPHOCYTES NFR BLD: 14.3 %
MCH RBC QN AUTO: 31.2 PG (ref 26–34)
MCHC RBC AUTO-ENTMCNC: 35.1 G/DL (ref 31–36)
MCV RBC AUTO: 88.9 FL (ref 80–100)
MONOCYTES # BLD: 0.4 K/UL (ref 0–1.3)
MONOCYTES NFR BLD: 5.6 %
NEUTROPHILS # BLD: 6.1 K/UL (ref 1.7–7.7)
NEUTROPHILS NFR BLD: 79.4 %
NITRITE UR QL STRIP.AUTO: NEGATIVE
PH UR STRIP.AUTO: 5.5 [PH] (ref 5–8)
PLATELET # BLD AUTO: 244 K/UL (ref 135–450)
PMV BLD AUTO: 7.9 FL (ref 5–10.5)
POTASSIUM SERPL-SCNC: 4 MMOL/L (ref 3.5–5.1)
PROT UR STRIP.AUTO-MCNC: ABNORMAL MG/DL
RBC # BLD AUTO: 4.6 M/UL (ref 4.2–5.9)
RBC CLUMPS #/AREA URNS AUTO: 153 /HPF (ref 0–4)
SODIUM SERPL-SCNC: 140 MMOL/L (ref 136–145)
SP GR UR STRIP.AUTO: 1.02 (ref 1–1.03)
UA COMPLETE W REFLEX CULTURE PNL UR: ABNORMAL
UA DIPSTICK W REFLEX MICRO PNL UR: YES
URN SPEC COLLECT METH UR: ABNORMAL
UROBILINOGEN UR STRIP-ACNC: 1 E.U./DL
WBC # BLD AUTO: 7.6 K/UL (ref 4–11)
WBC #/AREA URNS AUTO: 2 /HPF (ref 0–5)

## 2023-12-30 PROCEDURE — 80048 BASIC METABOLIC PNL TOTAL CA: CPT

## 2023-12-30 PROCEDURE — 81001 URINALYSIS AUTO W/SCOPE: CPT

## 2023-12-30 PROCEDURE — 2580000003 HC RX 258: Performed by: INTERNAL MEDICINE

## 2023-12-30 PROCEDURE — 99284 EMERGENCY DEPT VISIT MOD MDM: CPT

## 2023-12-30 PROCEDURE — 74176 CT ABD & PELVIS W/O CONTRAST: CPT

## 2023-12-30 PROCEDURE — 96374 THER/PROPH/DIAG INJ IV PUSH: CPT

## 2023-12-30 PROCEDURE — 6360000002 HC RX W HCPCS: Performed by: INTERNAL MEDICINE

## 2023-12-30 PROCEDURE — 85025 COMPLETE CBC W/AUTO DIFF WBC: CPT

## 2023-12-30 RX ORDER — 0.9 % SODIUM CHLORIDE 0.9 %
1000 INTRAVENOUS SOLUTION INTRAVENOUS ONCE
Status: COMPLETED | OUTPATIENT
Start: 2023-12-30 | End: 2023-12-31

## 2023-12-30 RX ORDER — KETOROLAC TROMETHAMINE 15 MG/ML
15 INJECTION, SOLUTION INTRAMUSCULAR; INTRAVENOUS ONCE
Status: COMPLETED | OUTPATIENT
Start: 2023-12-30 | End: 2023-12-30

## 2023-12-30 RX ADMIN — KETOROLAC TROMETHAMINE 15 MG: 15 INJECTION, SOLUTION INTRAMUSCULAR; INTRAVENOUS at 23:47

## 2023-12-30 RX ADMIN — SODIUM CHLORIDE 1000 ML: 9 INJECTION, SOLUTION INTRAVENOUS at 23:45

## 2023-12-30 ASSESSMENT — PAIN SCALES - GENERAL: PAINLEVEL_OUTOF10: 10

## 2023-12-30 ASSESSMENT — PAIN DESCRIPTION - LOCATION: LOCATION: FLANK

## 2023-12-30 ASSESSMENT — PAIN - FUNCTIONAL ASSESSMENT: PAIN_FUNCTIONAL_ASSESSMENT: 0-10

## 2023-12-30 ASSESSMENT — PAIN DESCRIPTION - ORIENTATION: ORIENTATION: LEFT

## 2023-12-30 ASSESSMENT — PAIN DESCRIPTION - PAIN TYPE: TYPE: ACUTE PAIN

## 2023-12-31 PROCEDURE — 6370000000 HC RX 637 (ALT 250 FOR IP): Performed by: INTERNAL MEDICINE

## 2023-12-31 RX ORDER — TAMSULOSIN HYDROCHLORIDE 0.4 MG/1
0.4 CAPSULE ORAL DAILY
Qty: 30 CAPSULE | Refills: 0 | Status: SHIPPED | OUTPATIENT
Start: 2023-12-31

## 2023-12-31 RX ORDER — HYDROCODONE BITARTRATE AND ACETAMINOPHEN 5; 325 MG/1; MG/1
1 TABLET ORAL ONCE
Status: COMPLETED | OUTPATIENT
Start: 2023-12-31 | End: 2023-12-31

## 2023-12-31 RX ORDER — ONDANSETRON 4 MG/1
4 TABLET, FILM COATED ORAL DAILY PRN
Qty: 15 TABLET | Refills: 0 | Status: SHIPPED | OUTPATIENT
Start: 2023-12-31

## 2023-12-31 RX ORDER — HYDROCODONE BITARTRATE AND ACETAMINOPHEN 5; 325 MG/1; MG/1
1 TABLET ORAL EVERY 4 HOURS PRN
Qty: 18 TABLET | Refills: 0 | Status: SHIPPED | OUTPATIENT
Start: 2023-12-31 | End: 2024-01-03

## 2023-12-31 RX ADMIN — HYDROCODONE BITARTRATE AND ACETAMINOPHEN 1 TABLET: 5; 325 TABLET ORAL at 00:15

## 2023-12-31 NOTE — ED PROVIDER NOTES
(1 tablet Oral Given 12/31/23 0015)       Records Reviewed  I reviewed the patient's previous medical records.    Social Determinants of Health  None    Chronic Conditions affecting care   has a past medical history of Avascular necrosis of bone (HCC), Colon polyps, Diabetes mellitus (HCC), External hemorrhoids, Hypoglycemia, and Irritable bowel syndrome with both constipation and diarrhea.    MDM and ED Course  Patient's urine does not show evidence of a UTI.  CAT scan shows a 2 mm kidney stone on the left side which has some evidence of obstruction.  Creatinine is slightly elevated at 1.4.  Patient was given fluids here in the ER.  He is feeling better after Toradol.  Patient is nontoxic and nonseptic in appearance.  Patient will be sent home with Vicodin along with Zofran and Flomax.  He was encouraged to follow-up in about 5 days with urology if symptoms still persist.  He states he has a strainer at home from his last kidney stone.  Patient was encouraged to return if symptoms worsen and the pain is not controlled with medication at home.  Patient understands the plan.  Patient is stable for discharge.              Final Impression  1. Kidney stone on left side    2. Acute renal insufficiency        Blood pressure (!) 157/93, pulse 89, temperature 98.3 °F (36.8 °C), temperature source Oral, resp. rate 18, height 1.778 m (5' 10\"), weight 113.4 kg (250 lb), SpO2 98 %.     Disposition:  DISPOSITION Decision To Discharge 12/31/2023 12:09:21 AM      Patient Referrals:  Jose Eduardo Duke MD  [address] [facility]   [facility] [address]  [phone]    Schedule an appointment as soon as possible for a visit in 5 days  If symptoms do not improve      Discharge Medications:  Discharge Medication List as of 12/31/2023 12:12 AM        START taking these medications    Details   HYDROcodone-acetaminophen (NORCO) 5-325 MG per tablet Take 1 tablet by mouth every 4 hours as needed for Pain for up to 3 days. Intended supply: 3 days. Take

## 2024-01-29 ENCOUNTER — HOSPITAL ENCOUNTER (EMERGENCY)
Age: 44
Discharge: HOME | End: 2024-01-29
Payer: COMMERCIAL

## 2024-01-29 ENCOUNTER — APPOINTMENT (OUTPATIENT)
Dept: GENERAL RADIOLOGY | Age: 44
End: 2024-01-29
Payer: COMMERCIAL

## 2024-01-29 VITALS
HEIGHT: 70 IN | TEMPERATURE: 98.2 F | BODY MASS INDEX: 35.65 KG/M2 | WEIGHT: 249 LBS | RESPIRATION RATE: 18 BRPM | OXYGEN SATURATION: 96 % | SYSTOLIC BLOOD PRESSURE: 163 MMHG | DIASTOLIC BLOOD PRESSURE: 80 MMHG | HEART RATE: 68 BPM

## 2024-01-29 DIAGNOSIS — S39.012A STRAIN OF LUMBAR REGION, INITIAL ENCOUNTER: Primary | ICD-10-CM

## 2024-01-29 PROCEDURE — 6360000002 HC RX W HCPCS: Performed by: PHYSICIAN ASSISTANT

## 2024-01-29 PROCEDURE — 72100 X-RAY EXAM L-S SPINE 2/3 VWS: CPT

## 2024-01-29 PROCEDURE — 96372 THER/PROPH/DIAG INJ SC/IM: CPT

## 2024-01-29 PROCEDURE — 99284 EMERGENCY DEPT VISIT MOD MDM: CPT

## 2024-01-29 PROCEDURE — 6370000000 HC RX 637 (ALT 250 FOR IP): Performed by: PHYSICIAN ASSISTANT

## 2024-01-29 RX ORDER — DICLOFENAC SODIUM 75 MG/1
75 TABLET, DELAYED RELEASE ORAL 2 TIMES DAILY PRN
Qty: 20 TABLET | Refills: 0 | Status: SHIPPED | OUTPATIENT
Start: 2024-01-29

## 2024-01-29 RX ORDER — CYCLOBENZAPRINE HCL 10 MG
10 TABLET ORAL 3 TIMES DAILY PRN
Qty: 21 TABLET | Refills: 0 | Status: SHIPPED | OUTPATIENT
Start: 2024-01-29 | End: 2024-02-08

## 2024-01-29 RX ORDER — DIAZEPAM 5 MG/1
5 TABLET ORAL ONCE
Status: COMPLETED | OUTPATIENT
Start: 2024-01-29 | End: 2024-01-29

## 2024-01-29 RX ORDER — FAMOTIDINE 20 MG/1
20 TABLET, FILM COATED ORAL ONCE
Status: DISCONTINUED | OUTPATIENT
Start: 2024-01-29 | End: 2024-01-29 | Stop reason: HOSPADM

## 2024-01-29 RX ORDER — DIPHENHYDRAMINE HCL 25 MG
25 TABLET ORAL ONCE
Status: DISCONTINUED | OUTPATIENT
Start: 2024-01-29 | End: 2024-01-29 | Stop reason: HOSPADM

## 2024-01-29 RX ORDER — KETOROLAC TROMETHAMINE 30 MG/ML
30 INJECTION, SOLUTION INTRAMUSCULAR; INTRAVENOUS ONCE
Status: COMPLETED | OUTPATIENT
Start: 2024-01-29 | End: 2024-01-29

## 2024-01-29 RX ADMIN — DIAZEPAM 5 MG: 5 TABLET ORAL at 16:08

## 2024-01-29 RX ADMIN — KETOROLAC TROMETHAMINE 30 MG: 30 INJECTION INTRAMUSCULAR; INTRAVENOUS at 16:08

## 2024-01-29 ASSESSMENT — PAIN DESCRIPTION - ORIENTATION: ORIENTATION: LOWER

## 2024-01-29 ASSESSMENT — PAIN SCALES - GENERAL: PAINLEVEL_OUTOF10: 7

## 2024-01-29 ASSESSMENT — PAIN DESCRIPTION - LOCATION: LOCATION: BACK

## 2024-01-29 NOTE — ED PROVIDER NOTES
Breath sounds: Normal breath sounds.   Abdominal:      General: Abdomen is flat. Bowel sounds are normal. There is no distension.      Palpations: Abdomen is soft.      Tenderness: There is no abdominal tenderness. There is no guarding or rebound.   Musculoskeletal:      Cervical back: Normal, normal range of motion and neck supple.      Thoracic back: Normal.      Lumbar back: Spasms and tenderness present. No swelling, edema, deformity, signs of trauma or bony tenderness. Decreased range of motion. Negative right straight leg raise test and negative left straight leg raise test.   Skin:     General: Skin is warm and dry.      Capillary Refill: Capillary refill takes less than 2 seconds.   Neurological:      Mental Status: He is alert and oriented to person, place, and time.      Sensory: No sensory deficit.      Motor: No weakness.      Gait: Gait normal.      Deep Tendon Reflexes: Reflexes normal.   Psychiatric:         Mood and Affect: Mood normal.         Behavior: Behavior normal.             DIAGNOSTIC RESULTS   LABS:    Labs Reviewed - No data to display    When ordered only abnormal lab results are displayed. All other labs were within normal range or not returned as of this dictation.    EKG: When ordered, EKG's are interpreted by the Emergency Department Physician in the absence of a cardiologist.  Please see their note for interpretation of EKG.    RADIOLOGY:   Non-plain film images such as CT, Ultrasound and MRI are read by the radiologist. Plain radiographic images are visualized and preliminarily interpreted by the ED Provider with the below findings:        Interpretation per the Radiologist below, if available at the time of this note:    XR LUMBAR SPINE (2-3 VIEWS)   Final Result   Unremarkable radiographic examination of the lumbar spine.           No results found.    No results found.    PROCEDURES   Unless otherwise noted below, none     Procedures    CRITICAL CARE TIME (.cctime)

## 2024-03-25 ENCOUNTER — HOSPITAL ENCOUNTER (EMERGENCY)
Age: 44
Discharge: HOME | End: 2024-03-25
Payer: COMMERCIAL

## 2024-03-25 VITALS
SYSTOLIC BLOOD PRESSURE: 136 MMHG | TEMPERATURE: 98 F | HEIGHT: 71 IN | HEART RATE: 80 BPM | RESPIRATION RATE: 16 BRPM | OXYGEN SATURATION: 97 % | WEIGHT: 246.8 LBS | DIASTOLIC BLOOD PRESSURE: 75 MMHG | BODY MASS INDEX: 34.55 KG/M2

## 2024-03-25 DIAGNOSIS — J02.9 ACUTE PHARYNGITIS, UNSPECIFIED ETIOLOGY: ICD-10-CM

## 2024-03-25 DIAGNOSIS — J06.9 VIRAL URI: Primary | ICD-10-CM

## 2024-03-25 LAB
FLUAV RNA RESP QL NAA+PROBE: NOT DETECTED
FLUBV RNA RESP QL NAA+PROBE: NOT DETECTED
S PYO AG THROAT QL: NEGATIVE
SARS-COV-2 RNA RESP QL NAA+PROBE: NOT DETECTED

## 2024-03-25 PROCEDURE — 99283 EMERGENCY DEPT VISIT LOW MDM: CPT

## 2024-03-25 PROCEDURE — 87081 CULTURE SCREEN ONLY: CPT

## 2024-03-25 PROCEDURE — 87636 SARSCOV2 & INF A&B AMP PRB: CPT

## 2024-03-25 PROCEDURE — 87880 STREP A ASSAY W/OPTIC: CPT

## 2024-03-25 RX ORDER — CETIRIZINE HYDROCHLORIDE 10 MG/1
10 TABLET ORAL DAILY
Qty: 30 TABLET | Refills: 0 | Status: SHIPPED | OUTPATIENT
Start: 2024-03-25

## 2024-03-25 ASSESSMENT — ENCOUNTER SYMPTOMS
COUGH: 0
SORE THROAT: 1
ABDOMINAL PAIN: 0
VOMITING: 0
NAUSEA: 0
DIARRHEA: 0
COLOR CHANGE: 0
SHORTNESS OF BREATH: 0
RHINORRHEA: 0
EYE REDNESS: 0

## 2024-03-25 ASSESSMENT — PAIN DESCRIPTION - LOCATION: LOCATION: THROAT

## 2024-03-25 ASSESSMENT — PAIN DESCRIPTION - DESCRIPTORS: DESCRIPTORS: SORE

## 2024-03-25 ASSESSMENT — PAIN SCALES - GENERAL: PAINLEVEL_OUTOF10: 2

## 2024-03-25 ASSESSMENT — PAIN DESCRIPTION - FREQUENCY: FREQUENCY: CONTINUOUS

## 2024-03-25 ASSESSMENT — PAIN DESCRIPTION - PAIN TYPE: TYPE: ACUTE PAIN

## 2024-03-25 ASSESSMENT — PAIN - FUNCTIONAL ASSESSMENT: PAIN_FUNCTIONAL_ASSESSMENT: 0-10

## 2024-03-25 NOTE — ED PROVIDER NOTES
[facility] EMERGENCY DEPARTMENT  EMERGENCY DEPARTMENT ENCOUNTER        Pt Name: Eusebio Brody  MRN: [id number]  Birthdate 1980  Date of evaluation: 3/25/2024  Provider: WAI Johnson  PCP: Friend, Susana Johnston MD  Note Started: 11:49 AM EDT 3/25/24      EMERSON. I have evaluated this patient.        CHIEF COMPLAINT       Chief Complaint   Patient presents with    Pharyngitis     Pt to ED with complaint of sore throat since Thursday. Pt states every time he wakes up his throat is \"scratchy\". Tried OTC medications, but not helping. Pt thinks it is sinus problems.        HISTORY OF PRESENT ILLNESS: 1 or more Elements     History From: patient  Limitations to history : None    Eusebio Brody is a 43 y.o. male who presents to the ED with complaint of sore throat and congestion for the last 4 days.  He reports a feeling of pressure in his sinuses that is actually improving.  Patient has been trying over-the-counter medication including throat spray and cough drops but does not feel it is helping and states \"I just need something stronger\".  Patient is also concerned because his wife is currently undergoing chemotherapy treatment.  Patient denies any fever.  He denies chest pain or shortness of breath.  He denies abdominal pain, nausea, vomiting or diarrhea.  No other acute complaints.  No known sick contacts.      Nursing Notes were all reviewed and agreed with or any disagreements were addressed in the HPI.    REVIEW OF SYSTEMS :      Review of Systems   Constitutional:  Negative for chills, fatigue and fever.   HENT:  Positive for congestion and sore throat. Negative for rhinorrhea.    Eyes:  Negative for redness.   Respiratory:  Negative for cough and shortness of breath.    Cardiovascular:  Negative for chest pain and palpitations.   Gastrointestinal:  Negative for abdominal pain, diarrhea, nausea and vomiting.   Genitourinary:  Negative for dysuria.   Musculoskeletal:  Negative for

## 2024-03-27 ENCOUNTER — HOSPITAL ENCOUNTER (EMERGENCY)
Age: 44
Discharge: HOME | End: 2024-03-27
Payer: COMMERCIAL

## 2024-03-27 ENCOUNTER — APPOINTMENT (OUTPATIENT)
Dept: CT IMAGING | Age: 44
End: 2024-03-27
Payer: COMMERCIAL

## 2024-03-27 VITALS
RESPIRATION RATE: 20 BRPM | WEIGHT: 255.2 LBS | OXYGEN SATURATION: 100 % | BODY MASS INDEX: 35.73 KG/M2 | HEART RATE: 73 BPM | SYSTOLIC BLOOD PRESSURE: 155 MMHG | TEMPERATURE: 98.2 F | HEIGHT: 71 IN | DIASTOLIC BLOOD PRESSURE: 75 MMHG

## 2024-03-27 DIAGNOSIS — J02.9 ACUTE PHARYNGITIS, UNSPECIFIED ETIOLOGY: Primary | ICD-10-CM

## 2024-03-27 DIAGNOSIS — E04.1 THYROID NODULE: ICD-10-CM

## 2024-03-27 LAB
ALBUMIN SERPL-MCNC: 4.4 G/DL (ref 3.4–5)
ALBUMIN/GLOB SERPL: 1.6 {RATIO} (ref 1.1–2.2)
ALP SERPL-CCNC: 79 U/L (ref 40–129)
ALT SERPL-CCNC: 23 U/L (ref 10–40)
ANION GAP SERPL CALCULATED.3IONS-SCNC: 11 MMOL/L (ref 3–16)
AST SERPL-CCNC: 18 U/L (ref 15–37)
BASOPHILS # BLD: 0 K/UL (ref 0–0.2)
BASOPHILS NFR BLD: 0.3 %
BILIRUB SERPL-MCNC: 0.4 MG/DL (ref 0–1)
BUN SERPL-MCNC: 18 MG/DL (ref 7–20)
CALCIUM SERPL-MCNC: 9 MG/DL (ref 8.3–10.6)
CHLORIDE SERPL-SCNC: 100 MMOL/L (ref 99–110)
CO2 SERPL-SCNC: 25 MMOL/L (ref 21–32)
CREAT SERPL-MCNC: 1 MG/DL (ref 0.9–1.3)
DEPRECATED RDW RBC AUTO: 13 % (ref 12.4–15.4)
EOSINOPHIL # BLD: 0.1 K/UL (ref 0–0.6)
EOSINOPHIL NFR BLD: 1.9 %
GFR SERPLBLD CREATININE-BSD FMLA CKD-EPI: >90 ML/MIN/{1.73_M2}
GLUCOSE SERPL-MCNC: 143 MG/DL (ref 70–99)
HCT VFR BLD AUTO: 40.7 % (ref 40.5–52.5)
HETEROPH AB BLD QL IA: NEGATIVE
HGB BLD-MCNC: 14.2 G/DL (ref 13.5–17.5)
LACTATE BLDV-SCNC: 2.2 MMOL/L (ref 0.4–1.9)
LYMPHOCYTES # BLD: 2.1 K/UL (ref 1–5.1)
LYMPHOCYTES NFR BLD: 31 %
MAGNESIUM SERPL-MCNC: 2 MG/DL (ref 1.8–2.4)
MCH RBC QN AUTO: 31.2 PG (ref 26–34)
MCHC RBC AUTO-ENTMCNC: 34.9 G/DL (ref 31–36)
MCV RBC AUTO: 89.2 FL (ref 80–100)
MONOCYTES # BLD: 0.5 K/UL (ref 0–1.3)
MONOCYTES NFR BLD: 8.1 %
NEUTROPHILS # BLD: 4 K/UL (ref 1.7–7.7)
NEUTROPHILS NFR BLD: 58.7 %
PLATELET # BLD AUTO: 189 K/UL (ref 135–450)
PMV BLD AUTO: 8.2 FL (ref 5–10.5)
POTASSIUM SERPL-SCNC: 3.5 MMOL/L (ref 3.5–5.1)
PROCALCITONIN SERPL IA-MCNC: 0.1 NG/ML (ref 0–0.15)
PROT SERPL-MCNC: 7.2 G/DL (ref 6.4–8.2)
RBC # BLD AUTO: 4.56 M/UL (ref 4.2–5.9)
S PYO THROAT QL CULT: NORMAL
SODIUM SERPL-SCNC: 136 MMOL/L (ref 136–145)
WBC # BLD AUTO: 6.8 K/UL (ref 4–11)

## 2024-03-27 PROCEDURE — 83605 ASSAY OF LACTIC ACID: CPT

## 2024-03-27 PROCEDURE — 80053 COMPREHEN METABOLIC PANEL: CPT

## 2024-03-27 PROCEDURE — 99285 EMERGENCY DEPT VISIT HI MDM: CPT

## 2024-03-27 PROCEDURE — 6360000002 HC RX W HCPCS: Performed by: PHYSICIAN ASSISTANT

## 2024-03-27 PROCEDURE — 96375 TX/PRO/DX INJ NEW DRUG ADDON: CPT

## 2024-03-27 PROCEDURE — 83735 ASSAY OF MAGNESIUM: CPT

## 2024-03-27 PROCEDURE — 96374 THER/PROPH/DIAG INJ IV PUSH: CPT

## 2024-03-27 PROCEDURE — 6360000004 HC RX CONTRAST MEDICATION: Performed by: PHYSICIAN ASSISTANT

## 2024-03-27 PROCEDURE — 85025 COMPLETE CBC W/AUTO DIFF WBC: CPT

## 2024-03-27 PROCEDURE — 86308 HETEROPHILE ANTIBODY SCREEN: CPT

## 2024-03-27 PROCEDURE — 70491 CT SOFT TISSUE NECK W/DYE: CPT

## 2024-03-27 PROCEDURE — 84145 PROCALCITONIN (PCT): CPT

## 2024-03-27 RX ORDER — DEXAMETHASONE SODIUM PHOSPHATE 10 MG/ML
10 INJECTION INTRAMUSCULAR; INTRAVENOUS ONCE
Status: COMPLETED | OUTPATIENT
Start: 2024-03-27 | End: 2024-03-27

## 2024-03-27 RX ORDER — KETOROLAC TROMETHAMINE 15 MG/ML
15 INJECTION, SOLUTION INTRAMUSCULAR; INTRAVENOUS ONCE
Status: COMPLETED | OUTPATIENT
Start: 2024-03-27 | End: 2024-03-27

## 2024-03-27 RX ORDER — DEXAMETHASONE 4 MG/1
12 TABLET ORAL ONCE
Status: COMPLETED | OUTPATIENT
Start: 2024-03-27 | End: 2024-03-27

## 2024-03-27 RX ADMIN — DEXAMETHASONE 12 MG: 4 TABLET ORAL at 23:37

## 2024-03-27 RX ADMIN — DEXAMETHASONE SODIUM PHOSPHATE 10 MG: 10 INJECTION INTRAMUSCULAR; INTRAVENOUS at 21:47

## 2024-03-27 RX ADMIN — KETOROLAC TROMETHAMINE 15 MG: 15 INJECTION, SOLUTION INTRAMUSCULAR; INTRAVENOUS at 21:46

## 2024-03-27 RX ADMIN — IOPAMIDOL 75 ML: 755 INJECTION, SOLUTION INTRAVENOUS at 22:32

## 2024-03-27 ASSESSMENT — PAIN SCALES - GENERAL
PAINLEVEL_OUTOF10: 9
PAINLEVEL_OUTOF10: 9

## 2024-03-27 ASSESSMENT — PAIN DESCRIPTION - DESCRIPTORS: DESCRIPTORS: DISCOMFORT

## 2024-03-27 ASSESSMENT — PAIN - FUNCTIONAL ASSESSMENT: PAIN_FUNCTIONAL_ASSESSMENT: 0-10

## 2024-03-27 ASSESSMENT — PAIN DESCRIPTION - LOCATION
LOCATION: THROAT
LOCATION: THROAT

## 2024-03-28 NOTE — DISCHARGE INSTRUCTIONS
Thyroid Nodule    Your x-ray or CT scan shows a nodule (\"spot\") on your thyroid.  This will require follow-up for further evaluation.    Please call your Primary Care Physician (PCP) if you have already established one and you confirmed your PCP during your ER visit today. If you do not have a PCP, please call the [facility] Physicians scheduling number to schedule your Emergency Department follow up visit. Please mention that you are scheduling an \"ER follow up visit\" for a thyroid nodule.      Learning About Thyroid Nodules  Thyroid nodules are growths or lumps in the thyroid gland. Your thyroid is in the front of your neck. It controls how your body uses energy.  You may have tests to see if the nodule is caused by cancer. Most nodules aren't cancerous and don't cause problems. Many don't even need treatment.  If you do have cancer, it can usually be cured. Treatment will probably include surgery. You may also get radioactive iodine treatment. If your thyroid can't make thyroid hormone after treatment, you can take a pill every day to replace the hormone.  Follow-up care is a key part of your treatment and safety. Be sure to make and go to all appointments, and call your doctor if you are having problems. It's also a good idea to know your test results and keep a list of the medicines you take.  What is the next step in evaluation of a thyroid nodule?  Below are the recommended guidelines for management of thyroid nodules detected by CT/MRI, you can discuss these recommendations at your follow-up appointment:     Suspicious Findings (example:  you have enlarged lymph nodes or there is local invasion noted)  Arrange for a thyroid ultrasound.  No Suspicious Findings  If you are less than 35 years of age  Nodule is less than 1cm, no further evaluation.  Nodule is greater than or equal to 1cm, arrange for a thyroid ultrasound.  If you are greater than or equal to 35 years of age  Nodule is less than 1.5cm, no

## 2024-03-28 NOTE — ED PROVIDER NOTES
[facility] [place] EMERGENCY DEPARTMENT  EMERGENCY DEPARTMENT ENCOUNTER        Pt Name: Eusebio Brody  MRN: [id number]  Birthdate 1980  Date of evaluation: 3/27/2024  Provider: Tex Ryder PA-C  PCP: Friend, Susana Johnston MD  Note Started: 11:54 PM EDT 3/27/24      EMERSON. I have evaluated this patient.        CHIEF COMPLAINT       Chief Complaint   Patient presents with    Illness     Pt came in on Monday for pharyngitis and tested negative for flu/covid. Headache. Denies nausea or vomiting.     Headache    Pharyngitis       HISTORY OF PRESENT ILLNESS: 1 or more Elements     History From: patient  Limitations to history : None    Eusebio Brody is a 43 y.o. male who presents to the emergency department with a chief complaint of sore throat, headache, sinus congestion.  This began 6 days ago.  He was here 2 days ago and tested negative for strep, COVID and flu.  States he feels like his throat is getting worse and is having voice loss and some difficulty swallowing.  States he has a mild nonproductive cough but relates this mostly to some postnasal drip and denies a shortness of breath or deep productive cough.  Denies fevers, nausea, vomiting, urinary or bowel symptoms.  He has been taking Tylenol for symptoms at home.    Nursing Notes were all reviewed and agreed with or any disagreements were addressed in the HPI.    REVIEW OF SYSTEMS :      Review of Systems    Positives and Pertinent negatives as per HPI.     SURGICAL HISTORY     Past Surgical History:   Procedure Laterality Date    COLONOSCOPY N/A 6/27/2022    COLONOSCOPY POLYPECTOMY SNARE/COLD BIOPSY performed by Janet Jaquez MD at [facility] ENDOSCOPY    FOOT FRACTURE SURGERY Left     WRIST FRACTURE SURGERY         CURRENTMEDICATIONS       Previous Medications    CETIRIZINE (ZYRTEC) 10 MG TABLET    Take 1 tablet by mouth daily    DICLOFENAC (VOLTAREN) 75 MG EC TABLET    Take 1 tablet by mouth 2 times daily as needed for Pain

## 2024-07-16 ENCOUNTER — HOSPITAL ENCOUNTER (EMERGENCY)
Age: 44
Discharge: HOME | End: 2024-07-16
Payer: COMMERCIAL

## 2024-07-16 ENCOUNTER — APPOINTMENT (OUTPATIENT)
Dept: GENERAL RADIOLOGY | Age: 44
End: 2024-07-16
Payer: COMMERCIAL

## 2024-07-16 ENCOUNTER — APPOINTMENT (OUTPATIENT)
Dept: CT IMAGING | Age: 44
End: 2024-07-16
Payer: COMMERCIAL

## 2024-07-16 ENCOUNTER — TELEPHONE (OUTPATIENT)
Dept: CARDIOLOGY CLINIC | Age: 44
End: 2024-07-16

## 2024-07-16 VITALS
SYSTOLIC BLOOD PRESSURE: 127 MMHG | RESPIRATION RATE: 16 BRPM | HEIGHT: 69 IN | TEMPERATURE: 98.3 F | OXYGEN SATURATION: 96 % | WEIGHT: 250 LBS | DIASTOLIC BLOOD PRESSURE: 65 MMHG | BODY MASS INDEX: 37.03 KG/M2 | HEART RATE: 57 BPM

## 2024-07-16 DIAGNOSIS — R07.81 PLEURITIC CHEST PAIN: Primary | ICD-10-CM

## 2024-07-16 DIAGNOSIS — R07.9 LEFT-SIDED CHEST PAIN: ICD-10-CM

## 2024-07-16 LAB
ALBUMIN SERPL-MCNC: 4.6 G/DL (ref 3.4–5)
ALBUMIN/GLOB SERPL: 1.7 {RATIO} (ref 1.1–2.2)
ALP SERPL-CCNC: 67 U/L (ref 40–129)
ALT SERPL-CCNC: 26 U/L (ref 10–40)
ANION GAP SERPL CALCULATED.3IONS-SCNC: 13 MMOL/L (ref 3–16)
AST SERPL-CCNC: 22 U/L (ref 15–37)
BASOPHILS # BLD: 0.1 K/UL (ref 0–0.2)
BASOPHILS NFR BLD: 2.2 %
BILIRUB SERPL-MCNC: 0.6 MG/DL (ref 0–1)
BUN SERPL-MCNC: 14 MG/DL (ref 7–20)
CALCIUM SERPL-MCNC: 9.1 MG/DL (ref 8.3–10.6)
CHLORIDE SERPL-SCNC: 101 MMOL/L (ref 99–110)
CO2 SERPL-SCNC: 23 MMOL/L (ref 21–32)
CREAT SERPL-MCNC: 1 MG/DL (ref 0.9–1.3)
DEPRECATED RDW RBC AUTO: 13 % (ref 12.4–15.4)
EKG ATRIAL RATE: 62 BPM
EKG DIAGNOSIS: NORMAL
EKG P AXIS: 57 DEGREES
EKG P-R INTERVAL: 178 MS
EKG Q-T INTERVAL: 444 MS
EKG QRS DURATION: 164 MS
EKG QTC CALCULATION (BAZETT): 450 MS
EKG R AXIS: 30 DEGREES
EKG T AXIS: 37 DEGREES
EKG VENTRICULAR RATE: 62 BPM
EOSINOPHIL # BLD: 0.1 K/UL (ref 0–0.6)
EOSINOPHIL NFR BLD: 1.4 %
GFR SERPLBLD CREATININE-BSD FMLA CKD-EPI: >90 ML/MIN/{1.73_M2}
GLUCOSE SERPL-MCNC: 139 MG/DL (ref 70–99)
HCT VFR BLD AUTO: 40.2 % (ref 40.5–52.5)
HGB BLD-MCNC: 13.7 G/DL (ref 13.5–17.5)
LYMPHOCYTES # BLD: 1.6 K/UL (ref 1–5.1)
LYMPHOCYTES NFR BLD: 33.9 %
MCH RBC QN AUTO: 30.9 PG (ref 26–34)
MCHC RBC AUTO-ENTMCNC: 34.1 G/DL (ref 31–36)
MCV RBC AUTO: 90.7 FL (ref 80–100)
MONOCYTES # BLD: 0.4 K/UL (ref 0–1.3)
MONOCYTES NFR BLD: 7.3 %
NEUTROPHILS # BLD: 2.7 K/UL (ref 1.7–7.7)
NEUTROPHILS NFR BLD: 55.2 %
PLATELET # BLD AUTO: 198 K/UL (ref 135–450)
PMV BLD AUTO: 8.3 FL (ref 5–10.5)
POTASSIUM SERPL-SCNC: 3.7 MMOL/L (ref 3.5–5.1)
PROT SERPL-MCNC: 7.3 G/DL (ref 6.4–8.2)
RBC # BLD AUTO: 4.43 M/UL (ref 4.2–5.9)
SODIUM SERPL-SCNC: 137 MMOL/L (ref 136–145)
TROPONIN, HIGH SENSITIVITY: <6 NG/L (ref 0–22)
TROPONIN, HIGH SENSITIVITY: <6 NG/L (ref 0–22)
WBC # BLD AUTO: 4.9 K/UL (ref 4–11)

## 2024-07-16 PROCEDURE — 6370000000 HC RX 637 (ALT 250 FOR IP): Performed by: PHYSICIAN ASSISTANT

## 2024-07-16 PROCEDURE — 71260 CT THORAX DX C+: CPT

## 2024-07-16 PROCEDURE — 85025 COMPLETE CBC W/AUTO DIFF WBC: CPT

## 2024-07-16 PROCEDURE — 6360000004 HC RX CONTRAST MEDICATION: Performed by: PHYSICIAN ASSISTANT

## 2024-07-16 PROCEDURE — 93010 ELECTROCARDIOGRAM REPORT: CPT | Performed by: INTERNAL MEDICINE

## 2024-07-16 PROCEDURE — 99285 EMERGENCY DEPT VISIT HI MDM: CPT

## 2024-07-16 PROCEDURE — 84484 ASSAY OF TROPONIN QUANT: CPT

## 2024-07-16 PROCEDURE — 80053 COMPREHEN METABOLIC PANEL: CPT

## 2024-07-16 PROCEDURE — 93005 ELECTROCARDIOGRAM TRACING: CPT | Performed by: INTERNAL MEDICINE

## 2024-07-16 PROCEDURE — 71045 X-RAY EXAM CHEST 1 VIEW: CPT

## 2024-07-16 RX ORDER — ASPIRIN 325 MG
325 TABLET ORAL ONCE
Status: COMPLETED | OUTPATIENT
Start: 2024-07-16 | End: 2024-07-16

## 2024-07-16 RX ADMIN — ASPIRIN 325 MG: 325 TABLET ORAL at 12:08

## 2024-07-16 RX ADMIN — IOPAMIDOL 75 ML: 755 INJECTION, SOLUTION INTRAVENOUS at 12:45

## 2024-07-16 ASSESSMENT — LIFESTYLE VARIABLES
HOW MANY STANDARD DRINKS CONTAINING ALCOHOL DO YOU HAVE ON A TYPICAL DAY: PATIENT DOES NOT DRINK
HOW OFTEN DO YOU HAVE A DRINK CONTAINING ALCOHOL: NEVER

## 2024-07-16 ASSESSMENT — HEART SCORE: ECG: NON-SPECIFC REPOLARIZATION DISTURBANCE/LBTB/PM

## 2024-07-16 ASSESSMENT — PAIN - FUNCTIONAL ASSESSMENT: PAIN_FUNCTIONAL_ASSESSMENT: NONE - DENIES PAIN

## 2024-07-16 NOTE — ED NOTES
Patient had EKG in April and showed right bundle branch. Says he has an appt with cards for chest pain on and off, numbness, tingling this Thursday (7/18). Pain was concerning to him today and did not feel comfortable waiting until his appt.

## 2024-07-16 NOTE — TELEPHONE ENCOUNTER
Patient called in to report symptoms. He is currently at work and was experiencing sharp pains around his heart lasting around 15 mins. shallow breathing and SOB.    He is wanting to know if he should report to the ED? Currently has an appointment scheduled for 7/18.     During call patient stated that vitals were normal.     Please advise, patient mentioned right blockage.     Callback: [phone]

## 2024-07-16 NOTE — TELEPHONE ENCOUNTER
Called Eusebio- he will report to ER  Of note he is scheduled as a new patient and not yet established in our office

## 2024-07-16 NOTE — ED PROVIDER NOTES
[facility] EMERGENCY DEPARTMENT  EMERGENCY DEPARTMENT ENCOUNTER        Pt Name: Eusebio Brody  MRN: [id number]  Birthdate 1980  Date of evaluation: 7/16/2024  Provider: Kevin Tucker PA-C  PCP: Friend, Susana Johnston MD  Note Started: 12:17 PM EDT 7/16/24      EMERSON. I have evaluated this patient.        CHIEF COMPLAINT       Chief Complaint   Patient presents with    Chest Pain     Patient states he was having sharp chest pain on the way to work and at work this morning. States pain is gone now but more so having left sided pressure.        HISTORY OF PRESENT ILLNESS: 1 or more Elements     History From: pt    Eusebio Brody is a 43 y.o. male with past medical history of diabetes who presents complaining of chest pain for 1 month.  Patient reports intermittent chest pain on the left side described as sharp and stabbing.  Today he had sharp stabbing pain in the left side and lower but changed him to midsternal pressure and left-sided pressure that is been constant since.  Denies any trauma, fever, cough, back pain, abdominal pain, nausea vomiting, dizziness, syncope.  Reports a history of right bundle branch block in the past in April when getting preop testing for thyroid surgery.  He has a appointment with cardiology in 2 days for preop clearance due to his right bundle branch block.  Denies prior stress test.    Nursing Notes were all reviewed and agreed with or any disagreements were addressed in the HPI.    REVIEW OF SYSTEMS :      Review of Systems   All other systems reviewed and are negative.      Positives and Pertinent negatives as per HPI.       PAST MEDICAL HISTORY    has a past medical history of Avascular necrosis of bone (HCC), Colon polyps, Diabetes mellitus (HCC), External hemorrhoids, Hypoglycemia, and Irritable bowel syndrome with both constipation and diarrhea.     SURGICAL HISTORY     Past Surgical History:   Procedure Laterality Date    COLONOSCOPY N/A

## 2024-07-17 NOTE — PROGRESS NOTES
stress agent should be used?     Answer:   Exercise     Order Specific Question:   Can the patient walk on the treadmill?     Answer:   Yes     Order Specific Question:   NM Radi[employer]arm[employer]     Answer:   Per Facility Protocol        Thank you for allowing me to participate in the care of your patient. Please do not hesitate to call.     Robyn Montoya DO, [facility]  Interventional Cardiology      [facility]   o: [phone]  [address]  [address]  [place], [place] [address]       NOTE:  This report was transcribed using voice recognition software.  Every effort was made to ensure accuracy; however, inadvertent computerized transcription errors may be present.    Scribe's Attestation: This note was scribed in the presence of Dr. Lindsay DO by Alessio Caldwell, VIJAYA.    I, Robyn Montoya, have personally performed the services described in this documentation as scribed by Alessio Caldwell RN in my presence, and it is both accurate and complete. An electronic signature was used to authenticate this note.

## 2024-07-18 ENCOUNTER — OFFICE VISIT (OUTPATIENT)
Dept: CARDIOLOGY CLINIC | Age: 44
End: 2024-07-18
Payer: COMMERCIAL

## 2024-07-18 VITALS
DIASTOLIC BLOOD PRESSURE: 74 MMHG | OXYGEN SATURATION: 94 % | WEIGHT: 251 LBS | HEART RATE: 59 BPM | SYSTOLIC BLOOD PRESSURE: 122 MMHG | HEIGHT: 69 IN | BODY MASS INDEX: 37.18 KG/M2

## 2024-07-18 DIAGNOSIS — Z01.810 PREOP CARDIOVASCULAR EXAM: Primary | ICD-10-CM

## 2024-07-18 DIAGNOSIS — R07.9 CHEST PAIN, UNSPECIFIED TYPE: ICD-10-CM

## 2024-07-18 DIAGNOSIS — R94.31 ABNORMAL ELECTROCARDIOGRAPHY: ICD-10-CM

## 2024-07-18 PROCEDURE — 99204 OFFICE O/P NEW MOD 45 MIN: CPT | Performed by: INTERNAL MEDICINE

## 2024-07-19 ENCOUNTER — HOSPITAL ENCOUNTER (OUTPATIENT)
Age: 44
Discharge: HOME | End: 2024-07-19
Attending: INTERNAL MEDICINE
Payer: COMMERCIAL

## 2024-07-19 ENCOUNTER — TELEPHONE (OUTPATIENT)
Dept: CARDIOLOGY CLINIC | Age: 44
End: 2024-07-19

## 2024-07-19 ENCOUNTER — HOSPITAL ENCOUNTER (OUTPATIENT)
Age: 44
Discharge: HOME | End: 2024-07-19
Attending: INTERNAL MEDICINE

## 2024-07-19 VITALS — BODY MASS INDEX: 36.36 KG/M2 | HEIGHT: 70 IN | WEIGHT: 254 LBS

## 2024-07-19 DIAGNOSIS — Z01.810 PRE-OPERATIVE CARDIOVASCULAR EXAMINATION: ICD-10-CM

## 2024-07-19 DIAGNOSIS — Z01.810 PRE-OPERATIVE CARDIOVASCULAR EXAMINATION: Primary | ICD-10-CM

## 2024-07-19 DIAGNOSIS — R91.1 PULMONARY NODULE SEEN ON IMAGING STUDY: Primary | ICD-10-CM

## 2024-07-19 LAB
ECHO BSA: 2.39 M2
ECHO LV FRACTIONAL SHORTENING: 26 % (ref 28–44)
ECHO LV INTERNAL DIMENSION DIASTOLE INDEX: 1.99 CM/M2
ECHO LV INTERNAL DIMENSION DIASTOLIC: 4.6 CM (ref 4.2–5.9)
ECHO LV INTERNAL DIMENSION SYSTOLIC INDEX: 1.47 CM/M2
ECHO LV INTERNAL DIMENSION SYSTOLIC: 3.4 CM
ECHO LV IVSD: 1 CM (ref 0.6–1)
ECHO LV MASS 2D: 148.1 G (ref 88–224)
ECHO LV MASS INDEX 2D: 64.1 G/M2 (ref 49–115)
ECHO LV POSTERIOR WALL DIASTOLIC: 0.9 CM (ref 0.6–1)
ECHO LV RELATIVE WALL THICKNESS RATIO: 0.39
STRESS ANGINA INDEX: 0
STRESS BASELINE DIAS BP: 67 MMHG
STRESS BASELINE HR: 78 BPM
STRESS BASELINE SYS BP: 121 MMHG
STRESS ESTIMATED WORKLOAD: 7 METS
STRESS EXERCISE DUR MIN: 7 MIN
STRESS EXERCISE DUR SEC: 0 SEC
STRESS PEAK DIAS BP: 62 MMHG
STRESS PEAK SYS BP: 205 MMHG
STRESS PERCENT HR ACHIEVED: 86 %
STRESS POST PEAK HR: 151 BPM
STRESS RATE PRESSURE PRODUCT: NORMAL BPM*MMHG
STRESS TARGET HR: 176 BPM

## 2024-07-19 PROCEDURE — 93350 STRESS TTE ONLY: CPT | Performed by: INTERNAL MEDICINE

## 2024-07-19 PROCEDURE — 2580000003 HC RX 258: Performed by: INTERNAL MEDICINE

## 2024-07-19 PROCEDURE — 6360000004 HC RX CONTRAST MEDICATION: Performed by: INTERNAL MEDICINE

## 2024-07-19 PROCEDURE — 93017 CV STRESS TEST TRACING ONLY: CPT

## 2024-07-19 PROCEDURE — 93018 CV STRESS TEST I&R ONLY: CPT | Performed by: INTERNAL MEDICINE

## 2024-07-19 PROCEDURE — 93325 DOPPLER ECHO COLOR FLOW MAPG: CPT | Performed by: INTERNAL MEDICINE

## 2024-07-19 PROCEDURE — 93016 CV STRESS TEST SUPVJ ONLY: CPT | Performed by: INTERNAL MEDICINE

## 2024-07-19 RX ADMIN — SODIUM CHLORIDE, PRESERVATIVE FREE 2 ML: 5 INJECTION INTRAVENOUS at 11:34

## 2024-07-19 NOTE — RESULT ENCOUNTER NOTE
Discussed stress echo with Eusebio (Dr. Montoya reviewed)  Clearance letter sent  Pulm referral sent

## 2024-07-19 NOTE — TELEPHONE ENCOUNTER
Sydney from [facility] called about the clearance letter. She also had question about the pulmonology referral and if it was needed before Eusebio' surgery?  Relayed that referral could be for after procedure.     If letter has not been faxed please send to [phone]    Sydney is wanting a callback at [phone]

## 2024-07-26 ENCOUNTER — OFFICE VISIT (OUTPATIENT)
Dept: PULMONOLOGY | Age: 44
End: 2024-07-26
Payer: COMMERCIAL

## 2024-07-26 VITALS
BODY MASS INDEX: 37.18 KG/M2 | DIASTOLIC BLOOD PRESSURE: 86 MMHG | WEIGHT: 251 LBS | OXYGEN SATURATION: 95 % | HEIGHT: 69 IN | SYSTOLIC BLOOD PRESSURE: 116 MMHG | HEART RATE: 70 BPM

## 2024-07-26 DIAGNOSIS — R91.8 LUNG NODULES: Primary | ICD-10-CM

## 2024-07-26 DIAGNOSIS — F17.201 TOBACCO USE DISORDER, SEVERE, IN EARLY REMISSION: ICD-10-CM

## 2024-07-26 PROCEDURE — 99204 OFFICE O/P NEW MOD 45 MIN: CPT | Performed by: STUDENT IN AN ORGANIZED HEALTH CARE EDUCATION/TRAINING PROGRAM

## 2024-07-26 RX ORDER — ACETAMINOPHEN 500 MG
500 TABLET ORAL PRN
COMMUNITY

## 2024-07-26 NOTE — PROGRESS NOTES
to 2 years, of course if they are changing will re-consider biopsy.     Tobacco use - 30 yrs x 1.5 PPD quit 2022 encouraged to continue refrain    Snoring - high risk for SARAH, advised to not drive when sleepy, he is to discuss with PCP for sleep clinic referral, meanwhile sleep on side of head elevated.       COUNSELING AND FOLLOW UP     Risk: moderate    Wife was present during the visit.     We have invited Mr. Brody to Return in about 2 months (around 9/26/2024). or sooner if symptoms worsens or fails to improve. Personal contact card/number provided.       Francisco Bellamy MD  Pulmonary and Critical Care Medicine   [facility]

## 2024-08-17 PROBLEM — Z01.810 PREOP CARDIOVASCULAR EXAM: Status: RESOLVED | Noted: 2024-07-18 | Resolved: 2024-08-17

## 2024-08-26 ENCOUNTER — TELEPHONE (OUTPATIENT)
Dept: CASE MANAGEMENT | Age: 44
End: 2024-08-26

## 2024-08-26 NOTE — TELEPHONE ENCOUNTER
Patient due for 2 month f/u CT ordered by Dr. Her.  Mailed patient reminder letter to schedule with number to schedule, [phone].

## 2024-09-24 ENCOUNTER — HOSPITAL ENCOUNTER (OUTPATIENT)
Dept: CT IMAGING | Age: 44
Discharge: HOME | End: 2024-09-24
Attending: STUDENT IN AN ORGANIZED HEALTH CARE EDUCATION/TRAINING PROGRAM
Payer: COMMERCIAL

## 2024-09-24 DIAGNOSIS — R91.8 LUNG NODULES: ICD-10-CM

## 2024-09-24 PROCEDURE — 71250 CT THORAX DX C-: CPT

## 2024-09-30 ENCOUNTER — OFFICE VISIT (OUTPATIENT)
Dept: PULMONOLOGY | Age: 44
End: 2024-09-30
Payer: COMMERCIAL

## 2024-09-30 VITALS
DIASTOLIC BLOOD PRESSURE: 76 MMHG | HEIGHT: 69 IN | OXYGEN SATURATION: 95 % | WEIGHT: 261.6 LBS | BODY MASS INDEX: 38.75 KG/M2 | HEART RATE: 82 BPM | SYSTOLIC BLOOD PRESSURE: 126 MMHG

## 2024-09-30 DIAGNOSIS — F17.201 TOBACCO USE DISORDER, SEVERE, IN EARLY REMISSION: ICD-10-CM

## 2024-09-30 DIAGNOSIS — R06.83 SNORING: ICD-10-CM

## 2024-09-30 DIAGNOSIS — R91.8 LUNG NODULES: Primary | ICD-10-CM

## 2024-09-30 PROCEDURE — 99214 OFFICE O/P EST MOD 30 MIN: CPT | Performed by: STUDENT IN AN ORGANIZED HEALTH CARE EDUCATION/TRAINING PROGRAM

## 2024-09-30 NOTE — PROGRESS NOTES
Pulmonary and Critical Care Medicine    Date: 9/30/2024  CC: lung nodules    Referring Provider: No ref. provider found    HPI     HPI: Mr. Brody is a 43 y.o. male with a PMH of tobacco use (30 yrs, 1.5 PPD, quit 2022), no hx of asthma who was referred to the pulmonary clinic to help work up lung nodules. Patient was seen in the ER around July 16 for chest pain he underwent CT-PE which showed multiple nodules, with one nodule around 11 mm. He denies any personal hx of CA, he does have a hx of smoking around 45 pack yr, quit 12/2022. He had a paternal grandfather with lung CA. He was also noted to have thyroid nodule for which he underwent left thyroid lobectomy per patient it was not malignant (see ENT at [facility]). He also snores at home with un refreshed sleep.     Interm history: patient is here to discuss repeat CT chest which showed stable nodules no pulmonary complains today. No pulmonary symptoms, he is still refraining from smoking. After his thyroidectomy he feels he sleeps much better without snoring He is has not established with sleep     ROS: negative except stated above     PMH:  has a past medical history of Avascular necrosis of bone, Colon polyps, Diabetes mellitus (HCC), External hemorrhoids, Hypoglycemia, and Irritable bowel syndrome with both constipation and diarrhea.   PSH:  has a past surgical history that includes Wrist fracture surgery; Foot fracture surgery (Left); and Colonoscopy (N/A, 6/27/2022).    SH:  reports that he quit smoking about 22 months ago. His smoking use included cigarettes. He started smoking about 31 years ago. He has a 45 pack-year smoking history. He has never used smokeless tobacco. He reports current alcohol use. He reports that he does not use drugs.   FH: family history is not on file.    Allergies: Ultram [tramadol]   Work:    Home: no mold   Pets: no dogs or birds     OBJECTIVE DATA       PHYSICAL EXAM:   /76 (Site: Left Upper Arm,

## 2024-10-27 ENCOUNTER — HOSPITAL ENCOUNTER (EMERGENCY)
Age: 44
Discharge: HOME | End: 2024-10-27
Payer: COMMERCIAL

## 2024-10-27 VITALS
WEIGHT: 263.9 LBS | DIASTOLIC BLOOD PRESSURE: 81 MMHG | RESPIRATION RATE: 14 BRPM | BODY MASS INDEX: 37.78 KG/M2 | OXYGEN SATURATION: 96 % | SYSTOLIC BLOOD PRESSURE: 132 MMHG | HEART RATE: 74 BPM | HEIGHT: 70 IN | TEMPERATURE: 98 F

## 2024-10-27 DIAGNOSIS — S39.012A STRAIN OF LUMBAR REGION, INITIAL ENCOUNTER: Primary | ICD-10-CM

## 2024-10-27 PROCEDURE — 96372 THER/PROPH/DIAG INJ SC/IM: CPT

## 2024-10-27 PROCEDURE — 6360000002 HC RX W HCPCS

## 2024-10-27 PROCEDURE — 99284 EMERGENCY DEPT VISIT MOD MDM: CPT

## 2024-10-27 PROCEDURE — 6370000000 HC RX 637 (ALT 250 FOR IP)

## 2024-10-27 RX ORDER — DEXAMETHASONE SODIUM PHOSPHATE 10 MG/ML
10 INJECTION, SOLUTION INTRAMUSCULAR; INTRAVENOUS ONCE
Status: COMPLETED | OUTPATIENT
Start: 2024-10-27 | End: 2024-10-27

## 2024-10-27 RX ORDER — METHYLPREDNISOLONE 4 MG/1
TABLET ORAL
Qty: 1 KIT | Refills: 0 | Status: SHIPPED | OUTPATIENT
Start: 2024-10-27 | End: 2024-11-02

## 2024-10-27 RX ORDER — LIDOCAINE 4 G/G
1 PATCH TOPICAL DAILY
Status: DISCONTINUED | OUTPATIENT
Start: 2024-10-27 | End: 2024-10-27 | Stop reason: HOSPADM

## 2024-10-27 RX ORDER — METHOCARBAMOL 750 MG/1
1500 TABLET, FILM COATED ORAL ONCE
Status: COMPLETED | OUTPATIENT
Start: 2024-10-27 | End: 2024-10-27

## 2024-10-27 RX ORDER — METHOCARBAMOL 750 MG/1
750 TABLET, FILM COATED ORAL 4 TIMES DAILY
Qty: 40 TABLET | Refills: 0 | Status: SHIPPED | OUTPATIENT
Start: 2024-10-27 | End: 2024-11-06

## 2024-10-27 RX ADMIN — DEXAMETHASONE SODIUM PHOSPHATE 10 MG: 10 INJECTION INTRAMUSCULAR; INTRAVENOUS at 09:13

## 2024-10-27 RX ADMIN — METHOCARBAMOL 1500 MG: 750 TABLET ORAL at 09:12

## 2024-10-27 ASSESSMENT — PAIN DESCRIPTION - ORIENTATION: ORIENTATION: MID;LOWER

## 2024-10-27 ASSESSMENT — PAIN DESCRIPTION - LOCATION: LOCATION: BACK

## 2024-10-27 ASSESSMENT — PAIN - FUNCTIONAL ASSESSMENT: PAIN_FUNCTIONAL_ASSESSMENT: 0-10

## 2024-10-27 ASSESSMENT — PAIN DESCRIPTION - PAIN TYPE: TYPE: ACUTE PAIN

## 2024-10-27 ASSESSMENT — PAIN DESCRIPTION - FREQUENCY: FREQUENCY: CONTINUOUS

## 2024-10-27 ASSESSMENT — LIFESTYLE VARIABLES: HOW OFTEN DO YOU HAVE A DRINK CONTAINING ALCOHOL: NEVER

## 2024-10-27 ASSESSMENT — PAIN SCALES - GENERAL: PAINLEVEL_OUTOF10: 7

## 2024-10-27 NOTE — DISCHARGE INSTRUCTIONS
I did send a muscle relaxer Medrol Dosepak to your pharmacy.  Start taking the Medrol Dosepak tomorrow as you got a dose of steroid in the emergency department.  You can take your next dose of muscle relaxer around 5 PM or before bed.  Do not drink alcohol with this.  You can rotate between Tylenol and topical medications such as IcyHot, Biofreeze, lidocaine patches for additional relief of symptoms.  Do not take nonsteroidal anti-inflammatory drugs such as Motrin, ibuprofen, Aleve, Advil, naproxen, meloxicam, diclofenac with the steroid.    Follow-up with the back and spine center with the contact number listed above.  There are several locations that you can choose from.  Please discuss this with the  when you call.    I do recommend that you get a lumbar support with your occupation to avoid persistent or progressive back pain or degenerative changes.    Follow-up with your primary care as needed.

## 2024-10-27 NOTE — ED PROVIDER NOTES
PHYSICAL EXAM  1 or more Elements     ED Triage Vitals   BP Systolic BP Percentile Diastolic BP Percentile Temp Temp Source Pulse Respirations SpO2   10/27/24 0859 -- -- 10/27/24 0858 10/27/24 0858 10/27/24 0858 10/27/24 0858 10/27/24 0858   132/81   98 °F (36.7 °C) Oral 74 14 96 %      Height Weight - Scale         10/27/24 0859 10/27/24 0859         1.778 m (5' 10\") 119.7 kg (263 lb 14.4 oz)             Physical Exam  Constitutional:       General: He is awake. He is not in acute distress.     Appearance: Normal appearance. He is well-developed, well-groomed and normal weight.   Cardiovascular:      Rate and Rhythm: Normal rate and regular rhythm.      Pulses:           Carotid pulses are 2+ on the right side and 2+ on the left side.  Musculoskeletal:      Cervical back: Normal.      Thoracic back: Normal.      Lumbar back: Normal.   Neurological:      General: No focal deficit present.      Mental Status: He is alert, oriented to person, place, and time and easily aroused.      Sensory: Sensation is intact.      Motor: Motor function is intact.      Coordination: Coordination is intact.      Gait: Gait is intact. Gait normal.      Comments: Ambulatory with an observed steady gait   Psychiatric:         Attention and Perception: Attention normal.         Mood and Affect: Mood normal.         Speech: Speech normal.         Behavior: Behavior is cooperative.           DIAGNOSTIC RESULTS   LABS:    Labs Reviewed - No data to display    When ordered only abnormal lab results are displayed. All other labs were within normal range or not returned as of this dictation.    EKG: When ordered, EKG's are interpreted by the Emergency Department Physician in the absence of a cardiologist.  Please see their note for interpretation of EKG.    RADIOLOGY:   Non-plain film images such as CT, Ultrasound and MRI are read by the radiologist. Plain radiographic images are visualized and preliminarily interpreted by the ED Provider

## 2024-10-28 NOTE — ED NOTES
10/28/24  Pt contacted re: ED visit 10/27/24; \"still having pain but better\", advised to return if any problems/concerns.

## 2024-10-31 ENCOUNTER — OFFICE VISIT (OUTPATIENT)
Age: 44
End: 2024-10-31

## 2024-10-31 VITALS
DIASTOLIC BLOOD PRESSURE: 77 MMHG | BODY MASS INDEX: 36.88 KG/M2 | WEIGHT: 257.6 LBS | OXYGEN SATURATION: 95 % | HEIGHT: 70 IN | HEART RATE: 70 BPM | SYSTOLIC BLOOD PRESSURE: 125 MMHG | TEMPERATURE: 98.1 F

## 2024-10-31 DIAGNOSIS — J01.90 ACUTE SINUSITIS, RECURRENCE NOT SPECIFIED, UNSPECIFIED LOCATION: Primary | ICD-10-CM

## 2024-10-31 PROBLEM — K64.4 EXTERNAL HEMORRHOIDS WITHOUT COMPLICATION: Status: ACTIVE | Noted: 2017-08-08

## 2024-10-31 PROBLEM — K52.9 ILEITIS: Status: ACTIVE | Noted: 2024-10-31

## 2024-10-31 PROBLEM — K63.5 POLYP OF COLON: Status: ACTIVE | Noted: 2023-04-11

## 2024-10-31 PROBLEM — R91.8 LUNG NODULES: Status: ACTIVE | Noted: 2024-09-25

## 2024-10-31 PROBLEM — K58.2 IRRITABLE BOWEL SYNDROME WITH BOTH CONSTIPATION AND DIARRHEA: Status: ACTIVE | Noted: 2017-08-08

## 2024-10-31 PROBLEM — G56.03 BILATERAL CARPAL TUNNEL SYNDROME: Status: ACTIVE | Noted: 2023-04-12

## 2024-10-31 RX ORDER — BENZONATATE 200 MG/1
200 CAPSULE ORAL 3 TIMES DAILY PRN
Qty: 90 CAPSULE | Refills: 0 | Status: SHIPPED | OUTPATIENT
Start: 2024-10-31 | End: 2024-11-30

## 2024-10-31 RX ORDER — AZITHROMYCIN 250 MG/1
250 TABLET, FILM COATED ORAL SEE ADMIN INSTRUCTIONS
Qty: 6 TABLET | Refills: 0 | Status: SHIPPED | OUTPATIENT
Start: 2024-10-31 | End: 2024-11-05

## 2024-10-31 NOTE — PROGRESS NOTES
[facility]    Eusebio Brody (:  1980 MRN: [id number]) is a 43 y.o. male, here for evaluation of the following chief complaint(s):  Sinusitis (PT. C/O COUGH AND HEAD CONGESTION X 4 DAYS.)    ASSESSMENT/PLAN:    ICD-10-CM    1. Acute sinusitis, recurrence not specified, unspecified location  J01.90           New Prescriptions    AZITHROMYCIN (ZITHROMAX) 250 MG TABLET    Take 1 tablet by mouth See Admin Instructions for 5 days 500mg on day 1 followed by 250mg on days 2 - 5    BENZONATATE (TESSALON) 200 MG CAPSULE    Take 1 capsule by mouth 3 times daily as needed for Cough     Summary  - I explained the warning signs of when to go to the emergency room.   - Follow up discussed and will be on an as-needed basis.  Differentials include: COVID-19, Laryngitis, GERD, Mononucleosis, Strep Pharyngitis, Viral Syndrome, Tonsillitis, Post Nasal Drip,    SUBJECTIVE/OBJECTIVE:  HPI             Vitals:    10/31/24 1633   BP: 125/77   Site: Right Upper Arm   Position: Sitting   Cuff Size: Large Adult   Pulse: 70   Temp: 98.1 °F (36.7 °C)   TempSrc: Oral   SpO2: 95%   Weight: 116.8 kg (257 lb 9.6 oz)   Height: 1.778 m (5' 10\")       No results found for this visit on 10/31/24.     No orders to display     PHYSICAL EXAM  Physical Exam  Vitals and nursing note reviewed.   Constitutional:       Appearance: Normal appearance. He is not ill-appearing or diaphoretic.   HENT:      Head: Normocephalic and atraumatic.      Right Ear: Tympanic membrane and external ear normal. Tympanic membrane is not erythematous or bulging. Tympanic membrane has normal mobility.      Left Ear: Tympanic membrane and external ear normal. Tympanic membrane is not erythematous or bulging. Tympanic membrane has normal mobility.      Nose: Congestion present. No rhinorrhea.      Right Sinus: Frontal sinus tenderness present.      Left Sinus: Frontal sinus tenderness present.      Mouth/Throat:      Lips: Pink.      Mouth: Mucous membranes

## 2024-11-11 ENCOUNTER — APPOINTMENT (OUTPATIENT)
Dept: CT IMAGING | Age: 44
End: 2024-11-11
Payer: COMMERCIAL

## 2024-11-11 ENCOUNTER — HOSPITAL ENCOUNTER (EMERGENCY)
Age: 44
Discharge: HOME | End: 2024-11-11
Attending: EMERGENCY MEDICINE
Payer: COMMERCIAL

## 2024-11-11 VITALS
OXYGEN SATURATION: 99 % | DIASTOLIC BLOOD PRESSURE: 78 MMHG | BODY MASS INDEX: 35.79 KG/M2 | RESPIRATION RATE: 16 BRPM | HEART RATE: 82 BPM | TEMPERATURE: 98.4 F | SYSTOLIC BLOOD PRESSURE: 148 MMHG | HEIGHT: 70 IN | WEIGHT: 250 LBS

## 2024-11-11 DIAGNOSIS — M54.50 ACUTE BILATERAL LOW BACK PAIN WITHOUT SCIATICA: Primary | ICD-10-CM

## 2024-11-11 LAB
BACTERIA URNS QL MICRO: ABNORMAL /HPF
BILIRUB UR QL STRIP.AUTO: NEGATIVE
CLARITY UR: ABNORMAL
COLOR UR: YELLOW
EPI CELLS #/AREA URNS AUTO: 1 /HPF (ref 0–5)
GLUCOSE UR STRIP.AUTO-MCNC: NEGATIVE MG/DL
HGB UR QL STRIP.AUTO: NEGATIVE
HYALINE CASTS #/AREA URNS AUTO: 14 /LPF (ref 0–8)
HYALINE CASTS: PRESENT
KETONES UR STRIP.AUTO-MCNC: NEGATIVE MG/DL
LEUKOCYTE ESTERASE UR QL STRIP.AUTO: NEGATIVE
NITRITE UR QL STRIP.AUTO: NEGATIVE
PH UR STRIP.AUTO: 5 [PH] (ref 5–8)
PROT UR STRIP.AUTO-MCNC: ABNORMAL MG/DL
RBC CLUMPS #/AREA URNS AUTO: 1 /HPF (ref 0–4)
SP GR UR STRIP.AUTO: >=1.03 (ref 1–1.03)
UA DIPSTICK W REFLEX MICRO PNL UR: YES
URN SPEC COLLECT METH UR: ABNORMAL
UROBILINOGEN UR STRIP-ACNC: 1 E.U./DL
WBC #/AREA URNS AUTO: 2 /HPF (ref 0–5)

## 2024-11-11 PROCEDURE — 81001 URINALYSIS AUTO W/SCOPE: CPT

## 2024-11-11 PROCEDURE — 6370000000 HC RX 637 (ALT 250 FOR IP): Performed by: EMERGENCY MEDICINE

## 2024-11-11 PROCEDURE — 99284 EMERGENCY DEPT VISIT MOD MDM: CPT

## 2024-11-11 PROCEDURE — 74176 CT ABD & PELVIS W/O CONTRAST: CPT

## 2024-11-11 RX ORDER — DULOXETIN HYDROCHLORIDE 30 MG/1
30 CAPSULE, DELAYED RELEASE ORAL DAILY
Qty: 30 CAPSULE | Refills: 1 | Status: SHIPPED | OUTPATIENT
Start: 2024-11-11 | End: 2024-12-11

## 2024-11-11 RX ORDER — LIDOCAINE 4 G/G
2 PATCH TOPICAL ONCE
Status: DISCONTINUED | OUTPATIENT
Start: 2024-11-11 | End: 2024-11-11 | Stop reason: HOSPADM

## 2024-11-11 RX ORDER — ACETAMINOPHEN 500 MG
1000 TABLET ORAL ONCE
Status: COMPLETED | OUTPATIENT
Start: 2024-11-11 | End: 2024-11-11

## 2024-11-11 RX ORDER — OXYCODONE HYDROCHLORIDE 5 MG/1
5 TABLET ORAL NIGHTLY PRN
Qty: 7 TABLET | Refills: 0 | Status: SHIPPED | OUTPATIENT
Start: 2024-11-11 | End: 2024-11-18

## 2024-11-11 RX ORDER — METHOCARBAMOL 500 MG/1
500 TABLET, FILM COATED ORAL EVERY 8 HOURS PRN
Qty: 15 TABLET | Refills: 0 | Status: SHIPPED | OUTPATIENT
Start: 2024-11-11

## 2024-11-11 RX ORDER — ACETAMINOPHEN 500 MG
1000 TABLET ORAL 3 TIMES DAILY
Qty: 42 TABLET | Refills: 0 | Status: SHIPPED | OUTPATIENT
Start: 2024-11-11 | End: 2024-11-18

## 2024-11-11 RX ORDER — MELOXICAM 7.5 MG/1
7.5 TABLET ORAL ONCE
Status: COMPLETED | OUTPATIENT
Start: 2024-11-11 | End: 2024-11-11

## 2024-11-11 RX ORDER — LIDOCAINE 4 G/G
2 PATCH TOPICAL DAILY
Qty: 14 EACH | Refills: 0 | Status: SHIPPED | OUTPATIENT
Start: 2024-11-11 | End: 2024-11-18

## 2024-11-11 RX ADMIN — MELOXICAM 7.5 MG: 7.5 TABLET ORAL at 18:23

## 2024-11-11 RX ADMIN — ACETAMINOPHEN 1000 MG: 500 TABLET ORAL at 18:23

## 2024-11-11 ASSESSMENT — PAIN DESCRIPTION - LOCATION
LOCATION: BACK
LOCATION: BACK

## 2024-11-11 ASSESSMENT — PAIN - FUNCTIONAL ASSESSMENT: PAIN_FUNCTIONAL_ASSESSMENT: 0-10

## 2024-11-11 ASSESSMENT — PAIN SCALES - GENERAL
PAINLEVEL_OUTOF10: 9
PAINLEVEL_OUTOF10: 9

## 2024-11-11 ASSESSMENT — PAIN DESCRIPTION - DESCRIPTORS: DESCRIPTORS: DISCOMFORT

## 2024-11-11 ASSESSMENT — PAIN DESCRIPTION - ORIENTATION: ORIENTATION: LOWER

## 2024-11-12 ENCOUNTER — OFFICE VISIT (OUTPATIENT)
Dept: ORTHOPEDIC SURGERY | Age: 44
End: 2024-11-12
Payer: COMMERCIAL

## 2024-11-12 VITALS — BODY MASS INDEX: 35.79 KG/M2 | WEIGHT: 250 LBS | HEIGHT: 70 IN

## 2024-11-12 DIAGNOSIS — S39.012A STRAIN OF LUMBAR REGION, INITIAL ENCOUNTER: Primary | ICD-10-CM

## 2024-11-12 DIAGNOSIS — M47.26 OTHER SPONDYLOSIS WITH RADICULOPATHY, LUMBAR REGION: ICD-10-CM

## 2024-11-12 PROCEDURE — 99204 OFFICE O/P NEW MOD 45 MIN: CPT | Performed by: PHYSICIAN ASSISTANT

## 2024-11-12 RX ORDER — PREDNISONE 20 MG/1
TABLET ORAL
Qty: 20 TABLET | Refills: 0 | Status: SHIPPED | OUTPATIENT
Start: 2024-11-12

## 2024-11-12 RX ORDER — KETOROLAC TROMETHAMINE 10 MG/1
10 TABLET, FILM COATED ORAL EVERY 6 HOURS PRN
Qty: 20 TABLET | Refills: 0 | Status: SHIPPED | OUTPATIENT
Start: 2024-11-12

## 2024-11-12 NOTE — DISCHARGE INSTRUCTIONS
Your prescription has been sent to [facility].    I would recommend talking a half tablet of the meloxicam (Mobic) that you already have at home once daily for the next 1-2 weeks.    Avoid other NSAID medications (ex: Ibuprofen, Advil, Motrin, Naproxen, Aleve, Aspirin, etc.) while you are taking meloxicam (Mobic)    Keep your clinic appointment tomorrow and keep your upcoming appointment with physical therapy.    You could look up stretching for low back pain or yoga for low back pain on YouTube and try that at home.    If you have any new or worsening issues after going home don't hesitate to return here for reevaluation at any time 24/7!

## 2024-11-12 NOTE — PROGRESS NOTES
include patient education and coordination of care:    Randy Herrera PA-C  Senior Physician Assistant  Board certified by the [facility]  [facility]

## 2024-11-14 ENCOUNTER — HOSPITAL ENCOUNTER (OUTPATIENT)
Dept: PHYSICAL THERAPY | Age: 44
Setting detail: THERAPIES SERIES
Discharge: HOME | End: 2024-11-14
Payer: COMMERCIAL

## 2024-11-14 DIAGNOSIS — R29.898 WEAKNESS OF BOTH HIPS: ICD-10-CM

## 2024-11-14 DIAGNOSIS — R19.8 ABDOMINAL WEAKNESS: ICD-10-CM

## 2024-11-14 DIAGNOSIS — R29.3 POOR POSTURE: ICD-10-CM

## 2024-11-14 DIAGNOSIS — M53.86 DECREASED RANGE OF MOTION OF LUMBAR SPINE: Primary | ICD-10-CM

## 2024-11-14 PROCEDURE — 97161 PT EVAL LOW COMPLEX 20 MIN: CPT

## 2024-11-14 PROCEDURE — 97110 THERAPEUTIC EXERCISES: CPT

## 2024-11-14 NOTE — PLAN OF CARE
Iontophoresis (16599)    VASO (82834)     Ultrasound (28909)    Group Therapy (35058)     Estim Attended (44697)    Canalith Repositioning (94011)     Physical Performance Test (62214)    Custom orthotic ()     Other:    Other:    Total Timed Code Tx Minutes 20 1  1     Total Treatment Minutes 45        Charge Justification:  (44061) THERAPEUTIC EXERCISE - Provided verbal/tactile cueing for activities related to strengthening, flexibility, endurance, ROM performed to prevent loss of range of motion, maintain or improve muscular strength or increase flexibility, following either an injury or surgery.     GOALS     Patient stated goal: learn preventative stretches ; return to PLOF  [] Progressing: [] Met: [] Not Met: [] Adjusted    Therapist goals for Patient:   Short Term Goals: To be achieved in: 2 weeks  1. Independent in HEP and progression per patient tolerance, in order to prevent re-injury.   [] Progressing: [] Met: [] Not Met: [] Adjusted  2. Patient will have a decrease in pain to <2/10  without pain meds to facilitate improvement in movement, function, and ADLs as indicated by Functional Deficits.  [] Progressing: [] Met: [] Not Met: [] Adjusted    Long Term Goals: To be achieved in: 4 weeks  1. Disability index score of 10% or less for the Modified Oswestry to assist with reaching prior level of function with activities such as prolonged standing and lifting.  [] Progressing: [] Met: [] Not Met: [] Adjusted  2. Patient will demonstrate increased AROM of lumbar spine flexion to 75 degrees without pain to allow for proper joint functioning to enable patient to complete a squat and lift something from the ground while at work.   [] Progressing: [] Met: [] Not Met: [] Adjusted  3. Patient will demonstrate increased Strength of posterior hip to at least 5/5 throughout without pain to allow for proper functional mobility to enable patient to return to standing for >1 hr.   [] Progressing: [] Met: [] Not Met:

## 2024-11-16 ASSESSMENT — ENCOUNTER SYMPTOMS
NAUSEA: 0
SHORTNESS OF BREATH: 0
ABDOMINAL PAIN: 0
BACK PAIN: 1
VOMITING: 0

## 2024-11-16 NOTE — ED PROVIDER NOTES
[facility] EMERGENCY DEPARTMENT    Name: Eusebio Brody : 1980 MRN: [id number] Date of Service: 2024    Initial VS: BP: (!) 148/78, Temp: 98.4 °F (36.9 °C), Pulse: 82, Respirations: 16, SpO2: 99 %     CC: back pain    HPI: this patient is a 43 y.o. male presenting to the ED from home. Mr. Brody tells me that this morning he began to experience intermittent, moderate-to-severe, sometimes aching, sometimes sharp pain across the lowermost portion of his back. This pain seems to be most prominent with bending and/or twisting movements of his torso; it is better when he is resting and remaining still. Occasionally this pain will radiate into his sides. As this pain did not seem to be improving over time he came to this department this afternoon to be evaluated. He adds that about 1.5 weeks ago he had an almost identical episode of back pain, which seemed to resolve entirely as he was treated with a course of methylprednisolone and methocarbamol. Mr. Brody has not appreciated any other changes from his usual state of health and he denies other current complaints. Specifically, Mr. Brody has not appreciated any associated fevers, bladder or bowel incontinence, urinary retention, saddle anesthesia, weakness, or numbness. He hasn't had any recent falls, accidents, or direct trauma to his back.  _____________________________________________________________________    Past Medical History:   Diagnosis Date    Avascular necrosis of distal femur     Class 2 obesity     Colon polyps     Irritable bowel syndrome     Lung nodules     Thyroid nodules     Tobacco use disorder remission       Past Surgical History:   Procedure Laterality Date    COLONOSCOPY N/A 2022    COLONOSCOPY POLYPECTOMY SNARE/COLD BIOPSY performed by Janet Jaquez MD at [facility] ENDOSCOPY    FOOT FRACTURE SURGERY Left     THYROID SURGERY      WRIST FRACTURE SURGERY       Social History     Tobacco Use    Smoking status:  doses. Do not take methocarbamol (robaxin) within 6 hours of taking this medicine. Max Daily Amount: 5 mg, Disp-7 tablet, R-0 Normal, Pharmacy: [address] ([place]), [place] [address] [address] - P [phone] - F [phone], Refills: 0 ordered, Ordered by Kevin Feng MD on 11/11/2024 at 2000    Clinical Impression(s)  1. Acute bilateral low back pain without sciatica      Provider Signature: MD Jung Davalos Michael R, MD  11/16/24 0657

## 2024-11-19 ENCOUNTER — HOSPITAL ENCOUNTER (OUTPATIENT)
Dept: PHYSICAL THERAPY | Age: 44
Setting detail: THERAPIES SERIES
Discharge: HOME | End: 2024-11-19
Payer: COMMERCIAL

## 2024-11-19 PROCEDURE — 97110 THERAPEUTIC EXERCISES: CPT

## 2024-11-19 PROCEDURE — 97140 MANUAL THERAPY 1/> REGIONS: CPT

## 2024-11-19 NOTE — FLOWSHEET NOTE
implemented, too soon (<30days) to assess goals progression   [] Goals require adjustment due to lack of progress  [] Patient is not progressing as expected and requires additional follow up with physician  [] Other:     TREATMENT PLAN     Frequency/Duration: 2x/week for 4 weeks for the following treatment interventions:    Interventions:  Therapeutic Exercise (82674) including: strength training, ROM, and functional mobility  Therapeutic Activities (25417) including: functional mobility training and education.  Neuromuscular Re-education (91719) activation and proprioception, including postural re-education.    Manual Therapy (62637) as indicated to include: Gr I-IV mobilizations, Grade V Manipulation, and Soft Tissue Mobilization  Patient education on joint protection, postural re-education, activity modification, and progression of HEP    Plan: Cont POC- Continue emphasis/focus on exercise progression, improving proper muscle recruitment and activation/motor control patterns, and increasing ROM. Next visit plan to progress weights, progress reps, and add new exercises     Electronically Signed by Bhupinder Hancock PT DPT [facility] Date: 11/19/2024     Note: Portions of this note have been templated and/or copied from initial evaluation, reassessments and prior notes for documentation efficiency.    Note: If patient does not return for scheduled/recommended follow up visits, this note will serve as a discharge from care along with the most recent update on progress.    Ortho Evaluation

## 2024-11-20 ENCOUNTER — HOSPITAL ENCOUNTER (OUTPATIENT)
Dept: PHYSICAL THERAPY | Age: 44
Setting detail: THERAPIES SERIES
Discharge: HOME | End: 2024-11-20
Payer: COMMERCIAL

## 2024-11-20 PROCEDURE — 97110 THERAPEUTIC EXERCISES: CPT

## 2024-11-26 ENCOUNTER — HOSPITAL ENCOUNTER (OUTPATIENT)
Dept: PHYSICAL THERAPY | Age: 44
Setting detail: THERAPIES SERIES
Discharge: HOME | End: 2024-11-26
Payer: COMMERCIAL

## 2024-11-26 PROCEDURE — 97110 THERAPEUTIC EXERCISES: CPT

## 2024-11-26 PROCEDURE — 97112 NEUROMUSCULAR REEDUCATION: CPT

## 2024-11-26 NOTE — FLOWSHEET NOTE
[facility] - Outpatient Rehabilitation and Therapy [address], [place] [address] office: [phone] fax: [phone]       Physical Therapy: TREATMENT/PROGRESS NOTE   Patient: Eusebio Brody (43 y.o. male)   Examination Date: 2024   :  1980 MRN: [id number]   Visit #:     Insurance Allowable Auth Needed   20 per year [x]Yes    []No     **AUTH NOT NEEDED UNTIL AFTER 20 VISITS** Insurance: Payor: BCBS / Plan: BCBS - [place] PPO / Product Type: *No Product type* /   Insurance ID: [id number] - ([place] BCBS)  Secondary Insurance (if applicable):     nsurance: BCBS  Ded: 800.00/800.00  OOP: 6000..00  Codes Billable: ALL  Copay: 0  Coins: 85/15  TeleHealth: YES  Visit Limit: 20   Treatment Diagnosis:     ICD-10-CM    1. Decreased range of motion of lumbar spine  M53.86       2. Weakness of both hips  R29.898       3. Poor posture  R29.3       4. Abdominal weakness  R19.8          Medical Diagnosis:  Strain of lumbar region, initial encounter [S39.012A]  Other spondylosis with radiculopathy, lumbar region [M47.26]   Referring Physician: Randy Herrera,*  PCP: Susana Michaels MD     Plan of care signed (Y/N): Y    Date of Patient follow up with Physician:      Plan of Care Report: NO  POC update due: (10 visits /OR AUTH LIMITS, whichever is less)  2024                                             Medical History:  Comorbidities:  None  Relevant Medical History: Avascular necrosis of bone (knees)                                         Precautions/ Contra-indications:           Latex allergy:  NO  Pacemaker:    NO  Contraindications for Manipulation: None  Date of Surgery: n/a  Other: Moderate bilateral hip joint osteoarthrosis.     Red Flags:  None    Suicide Screening:   The patient did not verbalize a primary behavioral concern, suicidal ideation, suicidal intent, or demonstrate suicidal behaviors.    Preferred Language for Healthcare:   [x]

## 2024-11-27 ENCOUNTER — HOSPITAL ENCOUNTER (OUTPATIENT)
Dept: PHYSICAL THERAPY | Age: 44
Setting detail: THERAPIES SERIES
Discharge: HOME | End: 2024-11-27
Payer: COMMERCIAL

## 2024-11-27 PROCEDURE — 97112 NEUROMUSCULAR REEDUCATION: CPT

## 2024-11-27 PROCEDURE — 97110 THERAPEUTIC EXERCISES: CPT

## 2024-11-27 NOTE — FLOWSHEET NOTE
expected and requires additional follow up with physician  [] Other:     TREATMENT PLAN     Frequency/Duration: 2x/week for 4 weeks for the following treatment interventions:    Interventions:  Therapeutic Exercise (20978) including: strength training, ROM, and functional mobility  Therapeutic Activities (19916) including: functional mobility training and education.  Neuromuscular Re-education (23294) activation and proprioception, including postural re-education.    Manual Therapy (76882) as indicated to include: Gr I-IV mobilizations, Grade V Manipulation, and Soft Tissue Mobilization  Patient education on joint protection, postural re-education, activity modification, and progression of HEP    Plan: Cont POC- Continue emphasis/focus on exercise progression, improving proper muscle recruitment and activation/motor control patterns, and increasing ROM. Next visit plan to progress weights, progress reps, and add new exercises     Electronically Signed by Bhupinder Hancock PT DPT [facility] Date: 11/27/2024     Note: Portions of this note have been templated and/or copied from initial evaluation, reassessments and prior notes for documentation efficiency.    Note: If patient does not return for scheduled/recommended follow up visits, this note will serve as a discharge from care along with the most recent update on progress.    Ortho Evaluation

## 2024-12-03 ENCOUNTER — HOSPITAL ENCOUNTER (OUTPATIENT)
Dept: PHYSICAL THERAPY | Age: 44
Setting detail: THERAPIES SERIES
Discharge: HOME | End: 2024-12-03
Payer: COMMERCIAL

## 2024-12-03 PROCEDURE — 97110 THERAPEUTIC EXERCISES: CPT

## 2024-12-03 PROCEDURE — 97112 NEUROMUSCULAR REEDUCATION: CPT

## 2024-12-03 NOTE — FLOWSHEET NOTE
[facility] - Outpatient Rehabilitation and Therapy [address], [place] [address] office: [phone] fax: [phone]       Physical Therapy: TREATMENT/PROGRESS NOTE   Patient: Eusebio Brody (43 y.o. male)   Examination Date: 2024   :  1980 MRN: [id number]   Visit #: 6     Insurance Allowable Auth Needed   20 per year [x]Yes    []No     **AUTH NOT NEEDED UNTIL AFTER 20 VISITS** Insurance: Payor: [place] BCBS / Plan: BCBS [place] PPO / Product Type: *No Product type* /   Insurance ID: [id number] - ([place] BCBS)  Secondary Insurance (if applicable):     nsurance: BCBS  Ded: 800.00/800.00  OOP: 6000..00  Codes Billable: ALL  Copay: 0  Coins: 85/15  TeleHealth: YES  Visit Limit: 20   Treatment Diagnosis:     ICD-10-CM    1. Decreased range of motion of lumbar spine  M53.86       2. Weakness of both hips  R29.898       3. Poor posture  R29.3       4. Abdominal weakness  R19.8          Medical Diagnosis:  Strain of lumbar region, initial encounter [S39.012A]  Other spondylosis with radiculopathy, lumbar region [M47.26]   Referring Physician: Randy Herrera,*  PCP: Susana Michaels MD     Plan of care signed (Y/N): Y    Date of Patient follow up with Physician:      Plan of Care Report: NO  POC update due: (10 visits /OR AUTH LIMITS, whichever is less)  2024                                             Medical History:  Comorbidities:  None  Relevant Medical History: Avascular necrosis of bone (knees)                                         Precautions/ Contra-indications:           Latex allergy:  NO  Pacemaker:    NO  Contraindications for Manipulation: None  Date of Surgery: n/a  Other: Moderate bilateral hip joint osteoarthrosis.     Red Flags:  None    Suicide Screening:   The patient did not verbalize a primary behavioral concern, suicidal ideation, suicidal intent, or demonstrate suicidal behaviors.    Preferred Language for Healthcare:   [x]

## 2024-12-05 ENCOUNTER — APPOINTMENT (OUTPATIENT)
Dept: PHYSICAL THERAPY | Age: 44
End: 2024-12-05
Payer: COMMERCIAL

## 2024-12-19 ENCOUNTER — HOSPITAL ENCOUNTER (OUTPATIENT)
Dept: PHYSICAL THERAPY | Age: 44
Setting detail: THERAPIES SERIES
Discharge: HOME | End: 2024-12-19
Payer: COMMERCIAL

## 2024-12-19 PROCEDURE — 97110 THERAPEUTIC EXERCISES: CPT

## 2024-12-19 NOTE — PLAN OF CARE
[facility] - Outpatient Rehabilitation and Therapy [address], [place] [address] office: [phone] fax: [phone]   Physical Therapy Re-Certification Plan of Care    Dear Randy Herrera, *  ,    We had the pleasure of treating the following patient for physical therapy services at [facility]. A summary of our findings can be found in the updated assessment below.  This includes our plan of care.  If you have any questions or concerns regarding these findings, please do not hesitate to contact me at the office phone number checked above.  Thank you for the referral.     Physician Signature:________________________________Date:__________________  By signing above (or electronic signature), therapist's plan is approved by physician      Total Visits: 7     Overall Response to Treatment:  Patient is responding well to treatment and improvement is noted with regards to goals and has met all goals at this time. Pt is pleased with progress and is consistent with HEP which provides relief. Pt now discharged from PT services.     24  ROM/Strength: (Blank cells denote NT)      Mvmt (norm) AROM L AROM R Notes     LUMBAR Flex (90) 75     Ext (25) 15     SB (25) 25 25      Rotation (30) No limitation No limitation        MMT L MMT R Notes       HIP  Flexion 5 5      Abduction 5 5      ER        IR        Extension 5 5      Recommendation:    [] Continue PT    [] Hold PT, pending MD visit   [x] Discharge to [facility]. Follow up with PT or MD PRN.       Physical Therapy: TREATMENT/PROGRESS NOTE   Patient: Eusebio Brody (43 y.o. male)   Examination Date: 2024   :  1980 MRN: [id number]   Visit #:     Insurance Allowable Auth Needed   20 per year [x]Yes    []No     **AUTH NOT NEEDED UNTIL AFTER 20 VISITS** Insurance: Payor: [place] BCBS / Plan: BCBS [place]PO / Product Type: *No Product type* /   Insurance ID: [id number] - ([place]

## 2025-03-05 ENCOUNTER — CLINICAL DOCUMENTATION (OUTPATIENT)
Dept: CASE MANAGEMENT | Age: 45
End: 2025-03-05

## 2025-03-05 NOTE — PROGRESS NOTES
Patient due for  f/u CT ordered by Dr. Bellamy.  Mailed patient reminder letter to schedule with number to schedule, [phone].

## 2025-03-19 ENCOUNTER — OFFICE VISIT (OUTPATIENT)
Age: 45
End: 2025-03-19

## 2025-03-19 VITALS
HEIGHT: 70 IN | BODY MASS INDEX: 37.37 KG/M2 | HEART RATE: 63 BPM | WEIGHT: 261 LBS | SYSTOLIC BLOOD PRESSURE: 118 MMHG | TEMPERATURE: 98.4 F | DIASTOLIC BLOOD PRESSURE: 78 MMHG | OXYGEN SATURATION: 96 %

## 2025-03-19 DIAGNOSIS — K05.10 GINGIVITIS: ICD-10-CM

## 2025-03-19 DIAGNOSIS — J06.9 VIRAL URI: Primary | ICD-10-CM

## 2025-03-19 DIAGNOSIS — R68.83 CHILLS (WITHOUT FEVER): ICD-10-CM

## 2025-03-19 LAB
INFLUENZA A ANTIBODY: NEGATIVE
INFLUENZA B ANTIBODY: NEGATIVE
Lab: NORMAL
PERFORMING INSTRUMENT: NORMAL
QC PASS/FAIL: NORMAL
SARS-COV-2, POC: NORMAL

## 2025-03-19 RX ORDER — PREDNISONE 5 MG/1
15 TABLET ORAL DAILY
COMMUNITY
Start: 2025-03-12

## 2025-03-19 ASSESSMENT — ENCOUNTER SYMPTOMS
SHORTNESS OF BREATH: 0
CHEST TIGHTNESS: 0
SORE THROAT: 0
ABDOMINAL PAIN: 0
VOMITING: 0
NAUSEA: 0
DIARRHEA: 1
RHINORRHEA: 0
COUGH: 0

## 2025-03-19 NOTE — PATIENT INSTRUCTIONS
COVID negative  Flu negative  Symptoms include congestion, diarrhea, with exam findings of gingival swelling/erythema there is concern Viral URI and gingivitis  Low concern for bacterial etiology of symptoms given lack of purulent findings and current course of illness less than 10 days, respiratory distress, community acquired pneumonia, PE otitis media, strep pharyngitis,  Recommended:  OTC Pseudoephedrine, Flonase, Zyrtec, and Saline nasal spray for congestion relief, advised to avoid with hx HTN or cardiac issues  Mucinex DM for cough with expectorant relief or plain DM if needed, advised to not use this during the night or with other DM products  Acetaminophen (Tylenol) and/or ibuprofen (Motrin, Advil) for aches/pains as needed, provided there are no contraindications  Starke diet, increased fluids, ginger candies for nausea/vomiting relief  Sipping on warm beverages (tea with honey), ice cream, popsicles, sore throat lozenges, Chloraseptic spray, warm salt water gargles for sore throat relief  Dx of HTN: OTC Coricidin, Flonase, Zyrtec, and Saline nasal spray for congestion relief  Prescribed:   Magic mouthwash   Strict ED follow up instructions provided

## 2025-03-19 NOTE — PROGRESS NOTES
proposed and they agreed with plan. Reviewed AVS with treatment instructions and answered questions - pt/family expresses understanding and agreement with the discussed treatment plan and AVS instructions.      SUBJECTIVE/OBJECTIVE:  44-year-old male who presents today with complaints of intermittent sharp right ear pain, constant dull aching right lower tooth pain.  He also states having some diarrhea for the last few days.  He states today while at work he began having hot and cold chills.  He states he feels better right now while being in the office.  He denies any fevers, weakness, dizziness, chest pain or shortness of breath.  He is concerned for some type of infection or virus and does not want to bring it home to his wife who is on chemo.  He has not tried any treatments or medications to help with symptoms.              VITAL SIGNS  Vitals:    03/19/25 0911   BP: 118/78   BP Site: Left Upper Arm   Patient Position: Sitting   BP Cuff Size: Large Adult   Pulse: 63   Temp: 98.4 °F (36.9 °C)   TempSrc: Oral   SpO2: 96%   Weight: 118.4 kg (261 lb)   Height: 1.778 m (5' 10\")       Review of Systems   Constitutional:  Positive for chills and fatigue. Negative for fever.   HENT:  Positive for dental problem and ear pain. Negative for congestion, rhinorrhea, sneezing and sore throat.    Respiratory:  Negative for cough, chest tightness and shortness of breath.    Gastrointestinal:  Positive for diarrhea. Negative for abdominal pain, nausea and vomiting.   Musculoskeletal:  Positive for myalgias.   Skin:  Negative for rash.   Neurological:  Positive for headaches. Negative for dizziness and light-headedness.     Pertinent positives and negatives as above, or may be included within the HPI.    Physical Exam  Constitutional:       Appearance: He is ill-appearing.   HENT:      Head: Normocephalic.      Right Ear: Tympanic membrane, ear canal and external ear normal.      Left Ear: Tympanic membrane, ear canal and

## 2025-03-23 ENCOUNTER — HOSPITAL ENCOUNTER (EMERGENCY)
Age: 45
Discharge: HOME | End: 2025-03-23
Payer: COMMERCIAL

## 2025-03-23 VITALS
SYSTOLIC BLOOD PRESSURE: 143 MMHG | TEMPERATURE: 98.9 F | WEIGHT: 260 LBS | DIASTOLIC BLOOD PRESSURE: 84 MMHG | RESPIRATION RATE: 16 BRPM | HEIGHT: 70 IN | HEART RATE: 65 BPM | BODY MASS INDEX: 37.22 KG/M2 | OXYGEN SATURATION: 97 %

## 2025-03-23 DIAGNOSIS — K04.7 DENTAL INFECTION: ICD-10-CM

## 2025-03-23 DIAGNOSIS — K08.89 PAIN, DENTAL: Primary | ICD-10-CM

## 2025-03-23 DIAGNOSIS — S02.5XXA CLOSED FRACTURE OF TOOTH, INITIAL ENCOUNTER: ICD-10-CM

## 2025-03-23 PROCEDURE — 6370000000 HC RX 637 (ALT 250 FOR IP): Performed by: PHYSICIAN ASSISTANT

## 2025-03-23 PROCEDURE — 96372 THER/PROPH/DIAG INJ SC/IM: CPT

## 2025-03-23 PROCEDURE — 6360000002 HC RX W HCPCS: Performed by: PHYSICIAN ASSISTANT

## 2025-03-23 PROCEDURE — 99284 EMERGENCY DEPT VISIT MOD MDM: CPT

## 2025-03-23 RX ORDER — MELOXICAM 7.5 MG/1
7.5-15 TABLET ORAL DAILY
Qty: 20 TABLET | Refills: 0 | Status: SHIPPED | OUTPATIENT
Start: 2025-03-23

## 2025-03-23 RX ORDER — KETOROLAC TROMETHAMINE 30 MG/ML
30 INJECTION, SOLUTION INTRAMUSCULAR; INTRAVENOUS ONCE
Status: COMPLETED | OUTPATIENT
Start: 2025-03-23 | End: 2025-03-23

## 2025-03-23 RX ORDER — AMOXICILLIN 250 MG/1
500 CAPSULE ORAL ONCE
Status: COMPLETED | OUTPATIENT
Start: 2025-03-23 | End: 2025-03-23

## 2025-03-23 RX ORDER — AMOXICILLIN 500 MG/1
500 CAPSULE ORAL 3 TIMES DAILY
Qty: 30 CAPSULE | Refills: 0 | Status: SHIPPED | OUTPATIENT
Start: 2025-03-23 | End: 2025-04-02

## 2025-03-23 RX ORDER — HYDROCODONE BITARTRATE AND ACETAMINOPHEN 5; 325 MG/1; MG/1
1 TABLET ORAL ONCE
Status: COMPLETED | OUTPATIENT
Start: 2025-03-23 | End: 2025-03-23

## 2025-03-23 RX ADMIN — KETOROLAC TROMETHAMINE 30 MG: 30 INJECTION, SOLUTION INTRAMUSCULAR at 19:30

## 2025-03-23 RX ADMIN — AMOXICILLIN 500 MG: 250 CAPSULE ORAL at 19:30

## 2025-03-23 RX ADMIN — HYDROCODONE BITARTRATE AND ACETAMINOPHEN 1 TABLET: 5; 325 TABLET ORAL at 19:30

## 2025-03-23 ASSESSMENT — PAIN DESCRIPTION - LOCATION: LOCATION: TEETH

## 2025-03-23 ASSESSMENT — PAIN SCALES - GENERAL
PAINLEVEL_OUTOF10: 9
PAINLEVEL_OUTOF10: 7

## 2025-03-23 NOTE — DISCHARGE INSTRUCTIONS
Do not take ibuprofen while taking meloxicam.  You can take tylenol and use the magic mouthwash with the prescribed medications.

## 2025-03-24 ASSESSMENT — ENCOUNTER SYMPTOMS
SORE THROAT: 0
SHORTNESS OF BREATH: 0
COUGH: 0
COLOR CHANGE: 0
VOMITING: 0
NAUSEA: 0
DIARRHEA: 0
RHINORRHEA: 0
ABDOMINAL PAIN: 0
EYE REDNESS: 0

## 2025-03-24 NOTE — ED PROVIDER NOTES
[facility] EMERGENCY DEPARTMENT  EMERGENCY DEPARTMENT ENCOUNTER        Pt Name: Eusebio Brody  MRN: [id number]  Birthdate 1980  Date of evaluation: 3/23/2025  Provider: WAI Johnson  PCP: Friend, Susana Johnston MD  Note Started: 11:17 PM EDT 3/23/25      [place]. I have evaluated this patient.        CHIEF COMPLAINT       Chief Complaint   Patient presents with    Dental Pain     Pt to ED c/o right upper dental pain after chipping his tooth a couple of weeks ago. PT sts pain exacerbated today. PT denies N/V and or fever.        HISTORY OF PRESENT ILLNESS: 1 or more Elements     History From: Patient, wife  Limitations to history : None    Eusebio Brody is a 44 y.o. male who presents to the emergency department with complaints of dental pain.  Patient reports a few weeks ago he fractured his tooth while eating.  It has not really bothered him until today when it began to hurt.  Patient is concerned that the root might be exposed.  He is trying to get in with a dentist, but has had barriers regarding his insurance.  He intends to call tomorrow to schedule follow-up, but does not feel he can wait due to pain.  Patient denies any fever or systemic symptoms.  No other acute complaints.    Nursing Notes were all reviewed and agreed with or any disagreements were addressed in the HPI.    REVIEW OF SYSTEMS :      Review of Systems   Constitutional:  Negative for chills, fatigue and fever.   HENT:  Positive for dental problem. Negative for congestion, rhinorrhea and sore throat.    Eyes:  Negative for redness.   Respiratory:  Negative for cough and shortness of breath.    Cardiovascular:  Negative for chest pain and palpitations.   Gastrointestinal:  Negative for abdominal pain, diarrhea, nausea and vomiting.   Genitourinary:  Negative for dysuria.   Musculoskeletal:  Negative for arthralgias and joint swelling.   Skin:  Negative for color change.   Neurological:  Negative for seizures, syncope,

## 2025-03-27 ENCOUNTER — HOSPITAL ENCOUNTER (OUTPATIENT)
Dept: PULMONOLOGY | Age: 45
Discharge: HOME | End: 2025-03-27
Attending: STUDENT IN AN ORGANIZED HEALTH CARE EDUCATION/TRAINING PROGRAM
Payer: COMMERCIAL

## 2025-03-27 ENCOUNTER — HOSPITAL ENCOUNTER (OUTPATIENT)
Dept: CT IMAGING | Age: 45
Discharge: HOME | End: 2025-03-27
Attending: STUDENT IN AN ORGANIZED HEALTH CARE EDUCATION/TRAINING PROGRAM
Payer: COMMERCIAL

## 2025-03-27 VITALS — HEART RATE: 70 BPM | RESPIRATION RATE: 16 BRPM | OXYGEN SATURATION: 98 %

## 2025-03-27 DIAGNOSIS — R91.8 LUNG NODULES: ICD-10-CM

## 2025-03-27 DIAGNOSIS — F17.201 TOBACCO USE DISORDER, SEVERE, IN EARLY REMISSION: ICD-10-CM

## 2025-03-27 LAB
DLCO %PRED: 80 %
DLCO PRED: NORMAL
DLCO/VA %PRED: NORMAL
DLCO/VA PRED: NORMAL
DLCO/VA: NORMAL
DLCO: NORMAL
EXPIRATORY TIME-POST: NORMAL
EXPIRATORY TIME: NORMAL
FEF 25-75 %CHNG: NORMAL
FEF 25-75 POST %PRED: NORMAL
FEF 25-75% %PRED-PRE: NORMAL
FEF 25-75% PRED: NORMAL
FEF 25-75-POST: NORMAL
FEF 25-75-PRE: NORMAL
FEV1 %PRED-POST: 76 %
FEV1 %PRED-PRE: 76 %
FEV1 PRED: NORMAL
FEV1-POST: NORMAL
FEV1-PRE: NORMAL
FEV1/FVC %PRED-POST: NORMAL
FEV1/FVC %PRED-PRE: NORMAL
FEV1/FVC PRED: NORMAL
FEV1/FVC-POST: 82 %
FEV1/FVC-PRE: 79 %
FVC %PRED-POST: NORMAL
FVC %PRED-PRE: NORMAL
FVC PRED: NORMAL
FVC-POST: NORMAL
FVC-PRE: NORMAL
GAW %PRED: NORMAL
GAW PRED: NORMAL
GAW: NORMAL
IC PRE %PRED: NORMAL
IC PRED: NORMAL
IC: NORMAL
MEP: NORMAL
MIP: NORMAL
MVV %PRED-PRE: NORMAL
MVV PRED: NORMAL
MVV-PRE: NORMAL
PEF %PRED-POST: NORMAL
PEF %PRED-PRE: NORMAL
PEF PRED: NORMAL
PEF%CHNG: NORMAL
PEF-POST: NORMAL
PEF-PRE: NORMAL
RAW %PRED: NORMAL
RAW PRED: NORMAL
RAW: NORMAL
RV PRE %PRED: NORMAL
RV PRED: NORMAL
RV: NORMAL
SVC %PRED: NORMAL
SVC PRED: NORMAL
SVC: NORMAL
TLC PRE %PRED: 93 %
TLC PRED: NORMAL
TLC: NORMAL
VA %PRED: NORMAL
VA PRED: NORMAL
VA: NORMAL
VTG %PRED: NORMAL
VTG PRED: NORMAL
VTG: NORMAL

## 2025-03-27 PROCEDURE — 94729 DIFFUSING CAPACITY: CPT

## 2025-03-27 PROCEDURE — 6370000000 HC RX 637 (ALT 250 FOR IP): Performed by: STUDENT IN AN ORGANIZED HEALTH CARE EDUCATION/TRAINING PROGRAM

## 2025-03-27 PROCEDURE — 94726 PLETHYSMOGRAPHY LUNG VOLUMES: CPT

## 2025-03-27 PROCEDURE — 94060 EVALUATION OF WHEEZING: CPT

## 2025-03-27 PROCEDURE — 71250 CT THORAX DX C-: CPT

## 2025-03-27 PROCEDURE — 94760 N-INVAS EAR/PLS OXIMETRY 1: CPT

## 2025-03-27 RX ORDER — ALBUTEROL SULFATE 90 UG/1
4 INHALANT RESPIRATORY (INHALATION) ONCE
Status: COMPLETED | OUTPATIENT
Start: 2025-03-27 | End: 2025-03-27

## 2025-03-27 RX ADMIN — Medication 4 PUFF: at 13:14

## 2025-03-27 ASSESSMENT — PULMONARY FUNCTION TESTS
FEV1_PERCENT_PREDICTED_PRE: 76
FEV1_PERCENT_PREDICTED_POST: 76
FEV1/FVC_PRE: 79
FEV1/FVC_POST: 82

## 2025-03-28 NOTE — PROCEDURES
Pulmonary Function Testing      Patient name:  Eusebio Brody      Unit #:   [id number]   Date of test:  3/27/2025   Date of interpretation:   3/28/2025    Mr. Eusebio Brody is a 44 y.o. year-old male. The spirometry data were acceptable and reproducible.     Spirometry:  Flow volume loops were normal. The FEV-1/FVC ratio was normal. The pre-bronchodilator FEV-1 was 2.95 liters (-1.6 Z score), which was normal. The FVC was 3.74 liters (-1.53 Z score), which was normal. Response to inhaled bronchodilators (albuterol) was nonsignificant.    Lung volumes:  Lung volumes were tested by plethysmography. The total lung capacity was 6.52 liters (-0.53 Z score), which was normal. The residual volume was 3.1 liters (3.1 Z score), which was increased. The ratio of residual volume to total lung capacity (RV/TLC) was 4.43 Z score, which was increased.     Diffusion capacity was found to be -1.41 Z score which is normal.      Interpretation:  Normal spirometry with lung volume evidence of air trapping.  Diffusion capacity is also normal.    Comments:  Z score  Mild -1.645 to -2.5  Moderate -2.5 to -4.0  Severe: < -4.0     Using Z-scores can reduce age and height bias, and the recommended thresholds correlate with mortality risk.    Ta Arenas MD

## 2025-03-31 ENCOUNTER — OFFICE VISIT (OUTPATIENT)
Dept: PULMONOLOGY | Age: 45
End: 2025-03-31
Payer: COMMERCIAL

## 2025-03-31 VITALS
WEIGHT: 262.8 LBS | BODY MASS INDEX: 37.62 KG/M2 | OXYGEN SATURATION: 97 % | SYSTOLIC BLOOD PRESSURE: 118 MMHG | HEART RATE: 67 BPM | DIASTOLIC BLOOD PRESSURE: 72 MMHG | HEIGHT: 70 IN

## 2025-03-31 DIAGNOSIS — R91.8 LUNG NODULES: Primary | ICD-10-CM

## 2025-03-31 DIAGNOSIS — R06.83 SNORING: ICD-10-CM

## 2025-03-31 DIAGNOSIS — R06.02 SOB (SHORTNESS OF BREATH): ICD-10-CM

## 2025-03-31 DIAGNOSIS — F17.201 TOBACCO USE DISORDER, SEVERE, IN EARLY REMISSION: ICD-10-CM

## 2025-03-31 PROCEDURE — G2211 COMPLEX E/M VISIT ADD ON: HCPCS | Performed by: STUDENT IN AN ORGANIZED HEALTH CARE EDUCATION/TRAINING PROGRAM

## 2025-03-31 PROCEDURE — 99214 OFFICE O/P EST MOD 30 MIN: CPT | Performed by: STUDENT IN AN ORGANIZED HEALTH CARE EDUCATION/TRAINING PROGRAM

## 2025-03-31 RX ORDER — ALBUTEROL SULFATE 90 UG/1
2 INHALANT RESPIRATORY (INHALATION) 4 TIMES DAILY PRN
Qty: 18 G | Refills: 0 | Status: SHIPPED | OUTPATIENT
Start: 2025-03-31

## 2025-03-31 RX ORDER — HYDROXYCHLOROQUINE SULFATE 200 MG/1
200 TABLET, FILM COATED ORAL 2 TIMES DAILY
COMMUNITY
Start: 2025-03-25

## 2025-03-31 NOTE — PROGRESS NOTES
Pulmonary and Critical Care Medicine    Date: 3/31/2025  CC: lung nodules      HPI     HPI: Mr. Brody is a 44 y.o. male with a PMH of tobacco use (30 yrs, 1.5 PPD, quit 2022), no hx of asthma who was referred to the pulmonary clinic to help work up lung nodules. Patient was seen in the ER around July 16 for chest pain he underwent CT-PE which showed multiple nodules, with one nodule around 11 mm. He denies any personal hx of CA, he does have a hx of smoking around 45 pack yr, quit 12/2022. He had a paternal grandfather with lung CA. He was also noted to have thyroid nodule for which he underwent left thyroid lobectomy per patient it was not malignant (see ENT at [facility]). He also snores at home with un refreshed sleep.     Interm history: patient is here to discuss PFT results, CT chest results, he today states with extreme exertion or climbing stairs he does get short of breath for several years, had stress test was told negative. No chest pain.     ROS: negative except stated above     PMH:  has a past medical history of Avascular necrosis of distal femur, Class 2 obesity, Colon polyps, Irritable bowel syndrome, Lung nodules, Thyroid nodules, and Tobacco use disorder remission.   PSH:  has a past surgical history that includes Wrist fracture surgery; Foot fracture surgery (Left); Colonoscopy (N/A, 06/27/2022); and Thyroid surgery.    SH:  reports that he quit smoking about 2 years ago. His smoking use included cigarettes. He started smoking about 32 years ago. He has a 45 pack-year smoking history. He has never used smokeless tobacco. He reports current alcohol use of about 8.0 standard drinks of alcohol per week. He reports that he does not use drugs.   FH: family history is not on file.    Allergies: Ultram [tramadol]   Work:    Home: no mold   Pets: no dogs or birds     OBJECTIVE DATA       PHYSICAL EXAM:   /72 (BP Site: Right Upper Arm, Patient Position: Sitting, BP Cuff Size:

## 2025-04-03 ENCOUNTER — OFFICE VISIT (OUTPATIENT)
Age: 45
End: 2025-04-03

## 2025-04-03 VITALS
OXYGEN SATURATION: 95 % | SYSTOLIC BLOOD PRESSURE: 117 MMHG | HEIGHT: 70 IN | BODY MASS INDEX: 36.42 KG/M2 | TEMPERATURE: 98.2 F | HEART RATE: 59 BPM | WEIGHT: 254.4 LBS | DIASTOLIC BLOOD PRESSURE: 77 MMHG

## 2025-04-03 DIAGNOSIS — J30.2 SEASONAL ALLERGIC RHINITIS DUE TO FUNGAL SPORES: Primary | ICD-10-CM

## 2025-04-03 RX ORDER — GUAIFENESIN 600 MG/1
600 TABLET, EXTENDED RELEASE ORAL 2 TIMES DAILY
Qty: 30 TABLET | Refills: 0 | Status: SHIPPED | OUTPATIENT
Start: 2025-04-03 | End: 2025-04-18

## 2025-04-03 RX ORDER — FEXOFENADINE HCL 180 MG/1
180 TABLET ORAL DAILY
Qty: 30 TABLET | Refills: 0 | Status: SHIPPED | OUTPATIENT
Start: 2025-04-03 | End: 2025-05-03

## 2025-04-03 NOTE — PROGRESS NOTES
Eusebio Brody (:  1980 MRN: [id number]) is a 44 y.o. male, here for evaluation of the following chief complaint(s):  Sinusitis (For past 3 days.)    ASSESSMENT/PLAN:    ICD-10-CM    1. Seasonal allergic rhinitis due to fungal spores  J30.2         New Prescriptions    FEXOFENADINE (ALLEGRA) 180 MG TABLET    Take 1 tablet by mouth daily    GUAIFENESIN (MUCINEX) 600 MG EXTENDED RELEASE TABLET    Take 1 tablet by mouth 2 times daily for 15 days     Disposition  - The patient's exam findings and complaint suggest that the disease process is allergy in nature as opposed to a bacterial etiology. Therefore an antibiotic is not necessary at this time. The patient understands that if symptoms get worse or fail to resolve 7 days out from the first day of symptoms, that they should return and be reevaluated and antibiotic therapy will be reconsidered.   - Follow up discussed and will be on an as-needed basis.  - I explained the warning signs of when to go to the emergency room.  Differentials include: Bronchitis, GERD, asthma, COVID-19, Influenza, Pneumonia, RSV, Viral Upper Respiratory Infection,    SUBJECTIVE/OBJECTIVE:  HPI   Since the onset, symptoms have been gradually worsening. Relevant symptoms include congestion, cough, post nasal drip, runny nose, and sinus pressure. Negative symptoms include fever and sputum production.  He is currently on amoxicillin for a dental infection as well as prednisone for CTS.         Vital Signs  Vitals:    25 0947   BP: 117/77   Pulse: 59   Temp: 98.2 °F (36.8 °C)   TempSrc: Oral   SpO2: 95%   Weight: 115.4 kg (254 lb 6.4 oz)   Height: 1.778 m (5' 10\")       No results found for this visit on 25.     No orders to display     PHYSICAL EXAM  Physical Exam  Vitals reviewed.   Constitutional:       Appearance: He is normal weight.   HENT:      Head: Normocephalic.      Right Ear: Tympanic membrane, ear canal and external ear normal.      Left Ear: Tympanic

## 2025-07-25 ENCOUNTER — OFFICE VISIT (OUTPATIENT)
Age: 45
End: 2025-07-25

## 2025-07-25 VITALS
HEART RATE: 76 BPM | TEMPERATURE: 98.6 F | OXYGEN SATURATION: 95 % | WEIGHT: 257 LBS | BODY MASS INDEX: 35.98 KG/M2 | DIASTOLIC BLOOD PRESSURE: 81 MMHG | SYSTOLIC BLOOD PRESSURE: 125 MMHG | HEIGHT: 71 IN

## 2025-07-25 DIAGNOSIS — R03.0 BLOOD PRESSURE ELEVATED WITHOUT HISTORY OF HTN: ICD-10-CM

## 2025-07-25 DIAGNOSIS — R09.81 SINUS CONGESTION: Primary | ICD-10-CM

## 2025-07-25 RX ORDER — DOXYCYCLINE 100 MG/1
CAPSULE ORAL
COMMUNITY
Start: 2025-06-15 | End: 2025-07-25

## 2025-07-25 RX ORDER — FLUTICASONE PROPIONATE 50 MCG
2 SPRAY, SUSPENSION (ML) NASAL DAILY
Qty: 1 EACH | Refills: 1 | Status: SHIPPED | OUTPATIENT
Start: 2025-07-25

## 2025-07-25 RX ORDER — CETIRIZINE HYDROCHLORIDE, PSEUDOEPHEDRINE HYDROCHLORIDE 5; 120 MG/1; MG/1
1 TABLET, FILM COATED, EXTENDED RELEASE ORAL 2 TIMES DAILY
Qty: 60 TABLET | Refills: 0 | Status: SHIPPED | OUTPATIENT
Start: 2025-07-25 | End: 2025-08-24

## 2025-07-25 NOTE — PATIENT INSTRUCTIONS
No point of care tests were completed  Symptoms include congestion, headaches, sore throat, sinus pressure/pain, postnasal drainage with exam findings of post nasal drip,maxillary sinus tenderness there is concern Viral URI  Low concern for bacterial etiology of symptoms given lack of purulent findings and current course of illness less than 10 days, respiratory distress, community acquired pneumonia, PE otitis media, strep pharyngitis,  Recommended:  OTC Pseudoephedrine, Flonase, Zyrtec, and Saline nasal spray for congestion relief, advised to avoid with hx HTN or cardiac issues  Mucinex DM for cough with expectorant relief or plain DM if needed, advised to not use this during the night or with other DM products  Acetaminophen (Tylenol) and/or ibuprofen (Motrin, Advil) for aches/pains as needed, provided there are no contraindications  [place] diet, increased fluids, ginger candies for nausea/vomiting relief  Sipping on warm beverages (tea with honey), ice cream, popsicles, sore throat lozenges, Chloraseptic spray, warm salt water gargles for sore throat relief  Dx of HTN: OTC Coricidin, Flonase, Zyrtec, and Saline nasal spray for congestion relief  Prescribed:   Zyrtec-d, flonase  Strict ED follow up instructions provided

## 2025-07-25 NOTE — PROGRESS NOTES
Eusebio Brody (:  1980) is a 44 y.o. male,Established patient, here for evaluation of the following chief complaint(s):  Sinus Problem (Headchae, throat scratchy x1day)      Assessment & Plan :  Visit Diagnoses and Associated Orders         Sinus congestion    -  Primary    cetirizine-psuedoephedrine (ZYRTEC-D) 5-120 MG per extended release tablet [61258]      fluticasone (FLONASE) 50 MCG/ACT nasal spray [01143]             Blood pressure elevated without history of HTN        Amb Referral to Primary Care [[id number] Custom]                 No point of care tests were completed  Symptoms include congestion, headaches, sore throat, sinus pressure/pain, postnasal drainage with exam findings of post nasal drip, maxillary sinus tenderness, there is concern Viral URI  Low concern for bacterial etiology of symptoms given lack of purulent findings and current course of illness less than 10 days, respiratory distress, community acquired pneumonia, PE otitis media, strep pharyngitis,  Recommended:  OTC Pseudoephedrine, Flonase, Zyrtec, and Saline nasal spray for congestion relief, advised to avoid with hx HTN or cardiac issues  Mucinex DM for cough with expectorant relief or plain DM if needed, advised to not use this during the night or with other DM products  Acetaminophen (Tylenol) and/or ibuprofen (Motrin, Advil) for aches/pains as needed, provided there are no contraindications  P[place] diet, increased fluids, ginger candies for nausea/vomiting relief  Sipping on warm beverages (tea with honey), ice cream, popsicles, sore throat lozenges, Chloraseptic spray, warm salt water gargles for sore throat relief  Dx of HTN: OTC Coricidin, Flonase, Zyrtec, and Saline nasal spray for congestion relief  Prescribed:   Zyrtec-d, flonase  Strict ED follow up instructions provided   Advised patient to come back in if symptoms are still there at day 10 and can reconsider doing antibiotics at that time for a sinus

## (undated) DEVICE — TRAP SPEC RETRV CLR PLAS POLYP IN LN SUCT QUIK CTCH

## (undated) DEVICE — SNARES COLD OVAL 10MM THIN

## (undated) DEVICE — CANNULA SAMP CO2 AD GRN 7FT CO2 AND 7FT O2 TBNG UNIV CONN